# Patient Record
Sex: MALE | Race: WHITE | NOT HISPANIC OR LATINO | Employment: OTHER | URBAN - METROPOLITAN AREA
[De-identification: names, ages, dates, MRNs, and addresses within clinical notes are randomized per-mention and may not be internally consistent; named-entity substitution may affect disease eponyms.]

---

## 2017-02-14 ENCOUNTER — OFFICE VISIT - HEALTHEAST (OUTPATIENT)
Dept: FAMILY MEDICINE | Facility: CLINIC | Age: 52
End: 2017-02-14

## 2017-02-14 DIAGNOSIS — R79.89 ABNORMAL LFTS: ICD-10-CM

## 2017-02-14 DIAGNOSIS — E78.5 HYPERLIPIDEMIA, UNSPECIFIED HYPERLIPIDEMIA TYPE: ICD-10-CM

## 2017-02-14 DIAGNOSIS — I10 ESSENTIAL HYPERTENSION WITH GOAL BLOOD PRESSURE LESS THAN 130/80: ICD-10-CM

## 2017-02-14 DIAGNOSIS — E11.9 TYPE 2 DIABETES MELLITUS (H): ICD-10-CM

## 2017-02-14 DIAGNOSIS — E66.9 OBESITY, UNSPECIFIED: ICD-10-CM

## 2017-02-14 LAB
CHOLEST SERPL-MCNC: 143 MG/DL
FASTING STATUS PATIENT QL REPORTED: NORMAL
HBA1C MFR BLD: 7.4 % (ref 3.5–6)
HDLC SERPL-MCNC: 44 MG/DL
LDLC SERPL CALC-MCNC: 70 MG/DL
TRIGL SERPL-MCNC: 145 MG/DL

## 2017-04-19 ENCOUNTER — COMMUNICATION - HEALTHEAST (OUTPATIENT)
Dept: FAMILY MEDICINE | Facility: CLINIC | Age: 52
End: 2017-04-19

## 2017-04-19 DIAGNOSIS — I10 HTN (HYPERTENSION): ICD-10-CM

## 2017-05-05 ENCOUNTER — COMMUNICATION - HEALTHEAST (OUTPATIENT)
Dept: FAMILY MEDICINE | Facility: CLINIC | Age: 52
End: 2017-05-05

## 2017-05-12 ENCOUNTER — COMMUNICATION - HEALTHEAST (OUTPATIENT)
Dept: FAMILY MEDICINE | Facility: CLINIC | Age: 52
End: 2017-05-12

## 2017-05-12 DIAGNOSIS — E11.9 TYPE 2 DIABETES MELLITUS WITHOUT COMPLICATION (H): ICD-10-CM

## 2017-06-20 ENCOUNTER — AMBULATORY - HEALTHEAST (OUTPATIENT)
Dept: FAMILY MEDICINE | Facility: CLINIC | Age: 52
End: 2017-06-20

## 2017-06-20 ENCOUNTER — OFFICE VISIT - HEALTHEAST (OUTPATIENT)
Dept: FAMILY MEDICINE | Facility: CLINIC | Age: 52
End: 2017-06-20

## 2017-06-20 DIAGNOSIS — E78.5 HYPERLIPIDEMIA, UNSPECIFIED HYPERLIPIDEMIA TYPE: ICD-10-CM

## 2017-06-20 DIAGNOSIS — K21.9 GASTROESOPHAGEAL REFLUX DISEASE WITHOUT ESOPHAGITIS: ICD-10-CM

## 2017-06-20 DIAGNOSIS — E11.9 TYPE 2 DIABETES MELLITUS (H): ICD-10-CM

## 2017-06-20 DIAGNOSIS — M54.50 CHRONIC BILATERAL LOW BACK PAIN WITHOUT SCIATICA: ICD-10-CM

## 2017-06-20 DIAGNOSIS — M77.11 LATERAL EPICONDYLITIS OF RIGHT ELBOW: ICD-10-CM

## 2017-06-20 DIAGNOSIS — I10 ESSENTIAL HYPERTENSION WITH GOAL BLOOD PRESSURE LESS THAN 130/80: ICD-10-CM

## 2017-06-20 DIAGNOSIS — G89.4 CHRONIC PAIN SYNDROME: ICD-10-CM

## 2017-06-20 DIAGNOSIS — G89.29 CHRONIC BILATERAL LOW BACK PAIN WITHOUT SCIATICA: ICD-10-CM

## 2017-06-20 DIAGNOSIS — R79.89 ABNORMAL LFTS: ICD-10-CM

## 2017-06-20 DIAGNOSIS — E66.09 NON MORBID OBESITY DUE TO EXCESS CALORIES: ICD-10-CM

## 2017-06-20 DIAGNOSIS — Z00.00 ROUTINE GENERAL MEDICAL EXAMINATION AT A HEALTH CARE FACILITY: ICD-10-CM

## 2017-06-20 LAB — HBA1C MFR BLD: 7.5 % (ref 3.5–6)

## 2017-06-20 ASSESSMENT — MIFFLIN-ST. JEOR: SCORE: 1863.19

## 2017-07-18 ENCOUNTER — COMMUNICATION - HEALTHEAST (OUTPATIENT)
Dept: FAMILY MEDICINE | Facility: CLINIC | Age: 52
End: 2017-07-18

## 2017-07-18 DIAGNOSIS — I10 HTN (HYPERTENSION): ICD-10-CM

## 2017-07-27 ENCOUNTER — RECORDS - HEALTHEAST (OUTPATIENT)
Dept: ADMINISTRATIVE | Facility: OTHER | Age: 52
End: 2017-07-27

## 2017-08-11 ENCOUNTER — COMMUNICATION - HEALTHEAST (OUTPATIENT)
Dept: FAMILY MEDICINE | Facility: CLINIC | Age: 52
End: 2017-08-11

## 2017-08-11 DIAGNOSIS — E11.9 TYPE 2 DIABETES MELLITUS WITHOUT COMPLICATION (H): ICD-10-CM

## 2017-08-16 ENCOUNTER — COMMUNICATION - HEALTHEAST (OUTPATIENT)
Dept: FAMILY MEDICINE | Facility: CLINIC | Age: 52
End: 2017-08-16

## 2017-08-16 DIAGNOSIS — E78.5 HYPERLIPIDEMIA: ICD-10-CM

## 2017-09-02 ENCOUNTER — COMMUNICATION - HEALTHEAST (OUTPATIENT)
Dept: FAMILY MEDICINE | Facility: CLINIC | Age: 52
End: 2017-09-02

## 2017-09-02 DIAGNOSIS — E11.9 TYPE 2 DIABETES MELLITUS WITHOUT COMPLICATION (H): ICD-10-CM

## 2017-11-21 ENCOUNTER — OFFICE VISIT - HEALTHEAST (OUTPATIENT)
Dept: FAMILY MEDICINE | Facility: CLINIC | Age: 52
End: 2017-11-21

## 2017-11-21 DIAGNOSIS — M54.50 CHRONIC BILATERAL LOW BACK PAIN WITHOUT SCIATICA: ICD-10-CM

## 2017-11-21 DIAGNOSIS — E11.9 TYPE 2 DIABETES MELLITUS (H): ICD-10-CM

## 2017-11-21 DIAGNOSIS — G89.4 CHRONIC PAIN SYNDROME: ICD-10-CM

## 2017-11-21 DIAGNOSIS — Z23 NEED FOR VACCINATION: ICD-10-CM

## 2017-11-21 DIAGNOSIS — I10 ESSENTIAL HYPERTENSION WITH GOAL BLOOD PRESSURE LESS THAN 130/80: ICD-10-CM

## 2017-11-21 DIAGNOSIS — E66.09 NON MORBID OBESITY DUE TO EXCESS CALORIES: ICD-10-CM

## 2017-11-21 DIAGNOSIS — E78.5 HYPERLIPIDEMIA, UNSPECIFIED HYPERLIPIDEMIA TYPE: ICD-10-CM

## 2017-11-21 DIAGNOSIS — R79.89 ABNORMAL LFTS: ICD-10-CM

## 2017-11-21 DIAGNOSIS — G89.29 CHRONIC BILATERAL LOW BACK PAIN WITHOUT SCIATICA: ICD-10-CM

## 2017-11-21 LAB
CHOLEST SERPL-MCNC: 142 MG/DL
FASTING STATUS PATIENT QL REPORTED: YES
HBA1C MFR BLD: 7.2 % (ref 3.5–6)
HDLC SERPL-MCNC: 43 MG/DL
LDLC SERPL CALC-MCNC: 63 MG/DL
TRIGL SERPL-MCNC: 182 MG/DL

## 2017-12-27 ENCOUNTER — COMMUNICATION - HEALTHEAST (OUTPATIENT)
Dept: FAMILY MEDICINE | Facility: CLINIC | Age: 52
End: 2017-12-27

## 2017-12-27 DIAGNOSIS — E11.9 TYPE 2 DIABETES MELLITUS WITHOUT COMPLICATION (H): ICD-10-CM

## 2018-01-25 ENCOUNTER — COMMUNICATION - HEALTHEAST (OUTPATIENT)
Dept: SCHEDULING | Facility: CLINIC | Age: 53
End: 2018-01-25

## 2018-01-31 ENCOUNTER — COMMUNICATION - HEALTHEAST (OUTPATIENT)
Dept: SCHEDULING | Facility: CLINIC | Age: 53
End: 2018-01-31

## 2018-01-31 ENCOUNTER — OFFICE VISIT - HEALTHEAST (OUTPATIENT)
Dept: FAMILY MEDICINE | Facility: CLINIC | Age: 53
End: 2018-01-31
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
## 2018-01-31 DIAGNOSIS — R51.9 PERSISTENT HEADACHES: ICD-10-CM

## 2018-01-31 DIAGNOSIS — E11.9 TYPE 2 DIABETES MELLITUS (H): ICD-10-CM

## 2018-01-31 DIAGNOSIS — J32.9 SINUSITIS: ICD-10-CM

## 2018-01-31 LAB
C REACTIVE PROTEIN LHE: 0.1 MG/DL (ref 0–0.8)
ERYTHROCYTE [SEDIMENTATION RATE] IN BLOOD BY WESTERGREN METHOD: 5 MM/HR (ref 0–15)
HBA1C MFR BLD: 7.5 % (ref 3.5–6)

## 2018-01-31 ASSESSMENT — MIFFLIN-ST. JEOR: SCORE: 1893.12

## 2018-02-01 ENCOUNTER — HOSPITAL ENCOUNTER (OUTPATIENT)
Dept: CT IMAGING | Facility: CLINIC | Age: 53
Discharge: HOME | End: 2018-02-01
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
## 2018-02-01 DIAGNOSIS — R51.9 PERSISTENT HEADACHES: ICD-10-CM

## 2018-03-20 ENCOUNTER — OFFICE VISIT - HEALTHEAST (OUTPATIENT)
Dept: FAMILY MEDICINE | Facility: CLINIC | Age: 53
End: 2018-03-20

## 2018-03-20 DIAGNOSIS — I10 ESSENTIAL HYPERTENSION WITH GOAL BLOOD PRESSURE LESS THAN 130/80: ICD-10-CM

## 2018-03-20 DIAGNOSIS — E66.09 NON MORBID OBESITY DUE TO EXCESS CALORIES: ICD-10-CM

## 2018-03-20 DIAGNOSIS — E78.5 HYPERLIPIDEMIA, UNSPECIFIED HYPERLIPIDEMIA TYPE: ICD-10-CM

## 2018-03-20 DIAGNOSIS — G89.4 CHRONIC PAIN SYNDROME: ICD-10-CM

## 2018-03-20 DIAGNOSIS — E11.9 TYPE 2 DIABETES MELLITUS WITHOUT COMPLICATION, WITHOUT LONG-TERM CURRENT USE OF INSULIN (H): ICD-10-CM

## 2018-03-24 ENCOUNTER — COMMUNICATION - HEALTHEAST (OUTPATIENT)
Dept: FAMILY MEDICINE | Facility: CLINIC | Age: 53
End: 2018-03-24

## 2018-03-24 DIAGNOSIS — E11.9 TYPE 2 DIABETES MELLITUS WITHOUT COMPLICATION (H): ICD-10-CM

## 2018-05-02 ENCOUNTER — COMMUNICATION - HEALTHEAST (OUTPATIENT)
Dept: FAMILY MEDICINE | Facility: CLINIC | Age: 53
End: 2018-05-02

## 2018-05-02 DIAGNOSIS — E78.5 HYPERLIPIDEMIA: ICD-10-CM

## 2018-05-31 ENCOUNTER — COMMUNICATION - HEALTHEAST (OUTPATIENT)
Dept: FAMILY MEDICINE | Facility: CLINIC | Age: 53
End: 2018-05-31

## 2018-06-05 ENCOUNTER — COMMUNICATION - HEALTHEAST (OUTPATIENT)
Dept: FAMILY MEDICINE | Facility: CLINIC | Age: 53
End: 2018-06-05

## 2018-06-05 DIAGNOSIS — E78.5 HYPERLIPIDEMIA: ICD-10-CM

## 2018-06-14 ENCOUNTER — COMMUNICATION - HEALTHEAST (OUTPATIENT)
Dept: FAMILY MEDICINE | Facility: CLINIC | Age: 53
End: 2018-06-14

## 2018-06-14 DIAGNOSIS — E11.9 TYPE 2 DIABETES MELLITUS WITHOUT COMPLICATION (H): ICD-10-CM

## 2018-07-30 ENCOUNTER — RECORDS - HEALTHEAST (OUTPATIENT)
Dept: ADMINISTRATIVE | Facility: OTHER | Age: 53
End: 2018-07-30

## 2018-08-21 ENCOUNTER — OFFICE VISIT - HEALTHEAST (OUTPATIENT)
Dept: FAMILY MEDICINE | Facility: CLINIC | Age: 53
End: 2018-08-21

## 2018-08-21 ENCOUNTER — AMBULATORY - HEALTHEAST (OUTPATIENT)
Dept: FAMILY MEDICINE | Facility: CLINIC | Age: 53
End: 2018-08-21

## 2018-08-21 DIAGNOSIS — R79.89 ABNORMAL LFTS: ICD-10-CM

## 2018-08-21 DIAGNOSIS — E11.9 DIABETES MELLITUS, TYPE 2 (H): ICD-10-CM

## 2018-08-21 DIAGNOSIS — E66.09 CLASS 1 OBESITY DUE TO EXCESS CALORIES WITH SERIOUS COMORBIDITY AND BODY MASS INDEX (BMI) OF 31.0 TO 31.9 IN ADULT: ICD-10-CM

## 2018-08-21 DIAGNOSIS — E66.811 CLASS 1 OBESITY DUE TO EXCESS CALORIES WITH SERIOUS COMORBIDITY AND BODY MASS INDEX (BMI) OF 31.0 TO 31.9 IN ADULT: ICD-10-CM

## 2018-08-21 DIAGNOSIS — E78.5 HYPERLIPIDEMIA, UNSPECIFIED HYPERLIPIDEMIA TYPE: ICD-10-CM

## 2018-08-21 DIAGNOSIS — D12.6 TUBULAR ADENOMA OF COLON: ICD-10-CM

## 2018-08-21 DIAGNOSIS — K21.9 GASTROESOPHAGEAL REFLUX DISEASE WITHOUT ESOPHAGITIS: ICD-10-CM

## 2018-08-21 DIAGNOSIS — Z00.00 ROUTINE GENERAL MEDICAL EXAMINATION AT A HEALTH CARE FACILITY: ICD-10-CM

## 2018-08-21 DIAGNOSIS — I10 ESSENTIAL HYPERTENSION: ICD-10-CM

## 2018-08-21 DIAGNOSIS — R06.83 SNORING: ICD-10-CM

## 2018-08-21 DIAGNOSIS — G89.4 CHRONIC PAIN SYNDROME: ICD-10-CM

## 2018-08-21 LAB
ALBUMIN SERPL-MCNC: 4.1 G/DL (ref 3.5–5)
ALP SERPL-CCNC: 95 U/L (ref 45–120)
ALT SERPL W P-5'-P-CCNC: 51 U/L (ref 0–45)
ANION GAP SERPL CALCULATED.3IONS-SCNC: 12 MMOL/L (ref 5–18)
AST SERPL W P-5'-P-CCNC: 34 U/L (ref 0–40)
BILIRUB SERPL-MCNC: 1.1 MG/DL (ref 0–1)
BUN SERPL-MCNC: 12 MG/DL (ref 8–22)
CALCIUM SERPL-MCNC: 10.2 MG/DL (ref 8.5–10.5)
CHLORIDE BLD-SCNC: 103 MMOL/L (ref 98–107)
CHOLEST SERPL-MCNC: 138 MG/DL
CO2 SERPL-SCNC: 24 MMOL/L (ref 22–31)
CREAT SERPL-MCNC: 0.97 MG/DL (ref 0.7–1.3)
CREAT UR-MCNC: 171.1 MG/DL
FASTING STATUS PATIENT QL REPORTED: YES
GFR SERPL CREATININE-BSD FRML MDRD: >60 ML/MIN/1.73M2
GLUCOSE BLD-MCNC: 164 MG/DL (ref 70–125)
HBA1C MFR BLD: 8.7 % (ref 3.5–6)
HDLC SERPL-MCNC: 45 MG/DL
LDLC SERPL CALC-MCNC: 61 MG/DL
MICROALBUMIN UR-MCNC: 0.97 MG/DL (ref 0–1.99)
MICROALBUMIN/CREAT UR: 5.7 MG/G
POTASSIUM BLD-SCNC: 4.3 MMOL/L (ref 3.5–5)
PROT SERPL-MCNC: 7.1 G/DL (ref 6–8)
SODIUM SERPL-SCNC: 139 MMOL/L (ref 136–145)
TRIGL SERPL-MCNC: 158 MG/DL

## 2018-08-21 ASSESSMENT — MIFFLIN-ST. JEOR: SCORE: 1867.72

## 2018-09-25 ENCOUNTER — COMMUNICATION - HEALTHEAST (OUTPATIENT)
Dept: FAMILY MEDICINE | Facility: CLINIC | Age: 53
End: 2018-09-25

## 2018-09-25 DIAGNOSIS — I10 HTN (HYPERTENSION): ICD-10-CM

## 2018-10-15 ENCOUNTER — OFFICE VISIT - HEALTHEAST (OUTPATIENT)
Dept: SLEEP MEDICINE | Facility: CLINIC | Age: 53
End: 2018-10-15

## 2018-10-15 DIAGNOSIS — R06.83 SNORING: ICD-10-CM

## 2018-10-15 DIAGNOSIS — Z91.89 AT RISK FOR SLEEP APNEA: ICD-10-CM

## 2018-10-15 ASSESSMENT — MIFFLIN-ST. JEOR: SCORE: 1874.98

## 2018-10-21 ENCOUNTER — COMMUNICATION - HEALTHEAST (OUTPATIENT)
Dept: FAMILY MEDICINE | Facility: CLINIC | Age: 53
End: 2018-10-21

## 2018-10-21 DIAGNOSIS — E78.5 HYPERLIPIDEMIA: ICD-10-CM

## 2018-11-24 ENCOUNTER — COMMUNICATION - HEALTHEAST (OUTPATIENT)
Dept: FAMILY MEDICINE | Facility: CLINIC | Age: 53
End: 2018-11-24

## 2018-11-24 DIAGNOSIS — E11.9 DIABETES MELLITUS, TYPE 2 (H): ICD-10-CM

## 2018-12-14 ENCOUNTER — OFFICE VISIT - HEALTHEAST (OUTPATIENT)
Dept: FAMILY MEDICINE | Facility: CLINIC | Age: 53
End: 2018-12-14

## 2018-12-14 DIAGNOSIS — E11.9 DIABETES MELLITUS, TYPE 2 (H): ICD-10-CM

## 2018-12-14 DIAGNOSIS — I10 ESSENTIAL HYPERTENSION: ICD-10-CM

## 2018-12-14 DIAGNOSIS — Z23 NEED FOR VACCINATION: ICD-10-CM

## 2018-12-14 DIAGNOSIS — R06.83 SNORING: ICD-10-CM

## 2018-12-14 DIAGNOSIS — M54.50 CHRONIC BILATERAL LOW BACK PAIN WITHOUT SCIATICA: ICD-10-CM

## 2018-12-14 DIAGNOSIS — E78.5 HYPERLIPIDEMIA, UNSPECIFIED HYPERLIPIDEMIA TYPE: ICD-10-CM

## 2018-12-14 DIAGNOSIS — G89.29 CHRONIC BILATERAL LOW BACK PAIN WITHOUT SCIATICA: ICD-10-CM

## 2018-12-14 DIAGNOSIS — D12.6 TUBULAR ADENOMA OF COLON: ICD-10-CM

## 2018-12-14 LAB
ALBUMIN SERPL-MCNC: 4 G/DL (ref 3.5–5)
ALP SERPL-CCNC: 91 U/L (ref 45–120)
ALT SERPL W P-5'-P-CCNC: 50 U/L (ref 0–45)
ANION GAP SERPL CALCULATED.3IONS-SCNC: 8 MMOL/L (ref 5–18)
AST SERPL W P-5'-P-CCNC: 33 U/L (ref 0–40)
BILIRUB SERPL-MCNC: 0.6 MG/DL (ref 0–1)
BUN SERPL-MCNC: 11 MG/DL (ref 8–22)
CALCIUM SERPL-MCNC: 9.5 MG/DL (ref 8.5–10.5)
CHLORIDE BLD-SCNC: 103 MMOL/L (ref 98–107)
CO2 SERPL-SCNC: 29 MMOL/L (ref 22–31)
CREAT SERPL-MCNC: 0.92 MG/DL (ref 0.7–1.3)
GFR SERPL CREATININE-BSD FRML MDRD: >60 ML/MIN/1.73M2
GLUCOSE BLD-MCNC: 146 MG/DL (ref 70–125)
HBA1C MFR BLD: 6.9 % (ref 3.5–6)
POTASSIUM BLD-SCNC: 5.1 MMOL/L (ref 3.5–5)
PROT SERPL-MCNC: 7.2 G/DL (ref 6–8)
SODIUM SERPL-SCNC: 140 MMOL/L (ref 136–145)

## 2019-01-10 ENCOUNTER — COMMUNICATION - HEALTHEAST (OUTPATIENT)
Dept: FAMILY MEDICINE | Facility: CLINIC | Age: 54
End: 2019-01-10

## 2019-01-10 DIAGNOSIS — I10 HTN (HYPERTENSION): ICD-10-CM

## 2019-01-25 ENCOUNTER — COMMUNICATION - HEALTHEAST (OUTPATIENT)
Dept: FAMILY MEDICINE | Facility: CLINIC | Age: 54
End: 2019-01-25

## 2019-01-25 DIAGNOSIS — I10 HTN (HYPERTENSION): ICD-10-CM

## 2019-04-23 ENCOUNTER — OFFICE VISIT - HEALTHEAST (OUTPATIENT)
Dept: FAMILY MEDICINE | Facility: CLINIC | Age: 54
End: 2019-04-23

## 2019-04-23 DIAGNOSIS — M77.11 RIGHT LATERAL EPICONDYLITIS: ICD-10-CM

## 2019-04-23 DIAGNOSIS — E11.9 DIABETES MELLITUS, TYPE 2 (H): ICD-10-CM

## 2019-04-23 DIAGNOSIS — D12.6 TUBULAR ADENOMA OF COLON: ICD-10-CM

## 2019-04-23 DIAGNOSIS — R05.9 COUGH: ICD-10-CM

## 2019-04-23 DIAGNOSIS — I10 ESSENTIAL HYPERTENSION: ICD-10-CM

## 2019-04-23 DIAGNOSIS — E78.5 HYPERLIPIDEMIA, UNSPECIFIED HYPERLIPIDEMIA TYPE: ICD-10-CM

## 2019-04-23 DIAGNOSIS — R06.02 SOB (SHORTNESS OF BREATH): ICD-10-CM

## 2019-04-23 LAB
ALBUMIN SERPL-MCNC: 4.1 G/DL (ref 3.5–5)
ALP SERPL-CCNC: 93 U/L (ref 45–120)
ALT SERPL W P-5'-P-CCNC: 48 U/L (ref 0–45)
ANION GAP SERPL CALCULATED.3IONS-SCNC: 10 MMOL/L (ref 5–18)
AST SERPL W P-5'-P-CCNC: 36 U/L (ref 0–40)
ATRIAL RATE - MUSE: 80 BPM
BILIRUB SERPL-MCNC: 0.9 MG/DL (ref 0–1)
BUN SERPL-MCNC: 17 MG/DL (ref 8–22)
CALCIUM SERPL-MCNC: 9.7 MG/DL (ref 8.5–10.5)
CHLORIDE BLD-SCNC: 102 MMOL/L (ref 98–107)
CHOLEST SERPL-MCNC: 164 MG/DL
CO2 SERPL-SCNC: 26 MMOL/L (ref 22–31)
CREAT SERPL-MCNC: 0.94 MG/DL (ref 0.7–1.3)
DIASTOLIC BLOOD PRESSURE - MUSE: NORMAL MMHG
FASTING STATUS PATIENT QL REPORTED: YES
GFR SERPL CREATININE-BSD FRML MDRD: >60 ML/MIN/1.73M2
GLUCOSE BLD-MCNC: 132 MG/DL (ref 70–125)
HBA1C MFR BLD: 7.2 % (ref 3.5–6)
HDLC SERPL-MCNC: 52 MG/DL
INTERPRETATION ECG - MUSE: NORMAL
LDLC SERPL CALC-MCNC: 81 MG/DL
P AXIS - MUSE: 24 DEGREES
POTASSIUM BLD-SCNC: 4.5 MMOL/L (ref 3.5–5)
PR INTERVAL - MUSE: 176 MS
PROT SERPL-MCNC: 7.1 G/DL (ref 6–8)
QRS DURATION - MUSE: 92 MS
QT - MUSE: 366 MS
QTC - MUSE: 422 MS
R AXIS - MUSE: -2 DEGREES
SODIUM SERPL-SCNC: 138 MMOL/L (ref 136–145)
SYSTOLIC BLOOD PRESSURE - MUSE: NORMAL MMHG
T AXIS - MUSE: 25 DEGREES
TRIGL SERPL-MCNC: 154 MG/DL
VENTRICULAR RATE- MUSE: 80 BPM

## 2019-04-27 ENCOUNTER — COMMUNICATION - HEALTHEAST (OUTPATIENT)
Dept: FAMILY MEDICINE | Facility: CLINIC | Age: 54
End: 2019-04-27

## 2019-04-27 DIAGNOSIS — E78.5 HYPERLIPIDEMIA: ICD-10-CM

## 2019-07-14 ENCOUNTER — COMMUNICATION - HEALTHEAST (OUTPATIENT)
Dept: FAMILY MEDICINE | Facility: CLINIC | Age: 54
End: 2019-07-14

## 2019-07-14 DIAGNOSIS — I10 HTN (HYPERTENSION): ICD-10-CM

## 2019-07-16 ENCOUNTER — COMMUNICATION - HEALTHEAST (OUTPATIENT)
Dept: FAMILY MEDICINE | Facility: CLINIC | Age: 54
End: 2019-07-16

## 2019-07-16 DIAGNOSIS — L30.9 DERMATITIS: ICD-10-CM

## 2019-07-24 ENCOUNTER — COMMUNICATION - HEALTHEAST (OUTPATIENT)
Dept: FAMILY MEDICINE | Facility: CLINIC | Age: 54
End: 2019-07-24

## 2019-07-24 DIAGNOSIS — E11.9 DIABETES MELLITUS, TYPE 2 (H): ICD-10-CM

## 2019-08-02 ENCOUNTER — RECORDS - HEALTHEAST (OUTPATIENT)
Dept: ADMINISTRATIVE | Facility: OTHER | Age: 54
End: 2019-08-02

## 2019-08-05 ENCOUNTER — RECORDS - HEALTHEAST (OUTPATIENT)
Dept: ADMINISTRATIVE | Facility: OTHER | Age: 54
End: 2019-08-05

## 2019-08-13 ENCOUNTER — COMMUNICATION - HEALTHEAST (OUTPATIENT)
Dept: SCHEDULING | Facility: CLINIC | Age: 54
End: 2019-08-13

## 2019-08-13 ENCOUNTER — RECORDS - HEALTHEAST (OUTPATIENT)
Dept: HEALTH INFORMATION MANAGEMENT | Facility: CLINIC | Age: 54
End: 2019-08-13

## 2019-08-14 ENCOUNTER — RECORDS - HEALTHEAST (OUTPATIENT)
Dept: ADMINISTRATIVE | Facility: OTHER | Age: 54
End: 2019-08-14

## 2019-08-27 ENCOUNTER — OFFICE VISIT - HEALTHEAST (OUTPATIENT)
Dept: FAMILY MEDICINE | Facility: CLINIC | Age: 54
End: 2019-08-27

## 2019-08-27 ENCOUNTER — AMBULATORY - HEALTHEAST (OUTPATIENT)
Dept: FAMILY MEDICINE | Facility: CLINIC | Age: 54
End: 2019-08-27

## 2019-08-27 DIAGNOSIS — R06.83 SNORING: ICD-10-CM

## 2019-08-27 DIAGNOSIS — E11.9 TYPE 2 DIABETES MELLITUS WITHOUT COMPLICATION, WITHOUT LONG-TERM CURRENT USE OF INSULIN (H): ICD-10-CM

## 2019-08-27 DIAGNOSIS — K21.9 GASTROESOPHAGEAL REFLUX DISEASE WITHOUT ESOPHAGITIS: ICD-10-CM

## 2019-08-27 DIAGNOSIS — E66.09 CLASS 1 OBESITY DUE TO EXCESS CALORIES WITH SERIOUS COMORBIDITY AND BODY MASS INDEX (BMI) OF 31.0 TO 31.9 IN ADULT: ICD-10-CM

## 2019-08-27 DIAGNOSIS — M25.562 ACUTE PAIN OF LEFT KNEE: ICD-10-CM

## 2019-08-27 DIAGNOSIS — E78.5 HYPERLIPIDEMIA, UNSPECIFIED HYPERLIPIDEMIA TYPE: ICD-10-CM

## 2019-08-27 DIAGNOSIS — R79.89 ABNORMAL LFTS: ICD-10-CM

## 2019-08-27 DIAGNOSIS — I10 ESSENTIAL HYPERTENSION: ICD-10-CM

## 2019-08-27 DIAGNOSIS — E66.811 CLASS 1 OBESITY DUE TO EXCESS CALORIES WITH SERIOUS COMORBIDITY AND BODY MASS INDEX (BMI) OF 31.0 TO 31.9 IN ADULT: ICD-10-CM

## 2019-08-27 DIAGNOSIS — Z00.00 ROUTINE GENERAL MEDICAL EXAMINATION AT A HEALTH CARE FACILITY: ICD-10-CM

## 2019-08-27 DIAGNOSIS — D12.6 TUBULAR ADENOMA OF COLON: ICD-10-CM

## 2019-08-27 LAB
ALBUMIN SERPL-MCNC: 4.1 G/DL (ref 3.5–5)
ALP SERPL-CCNC: 87 U/L (ref 45–120)
ALT SERPL W P-5'-P-CCNC: 89 U/L (ref 0–45)
ANION GAP SERPL CALCULATED.3IONS-SCNC: 9 MMOL/L (ref 5–18)
AST SERPL W P-5'-P-CCNC: 72 U/L (ref 0–40)
BILIRUB SERPL-MCNC: 0.6 MG/DL (ref 0–1)
BUN SERPL-MCNC: 13 MG/DL (ref 8–22)
CALCIUM SERPL-MCNC: 10.2 MG/DL (ref 8.5–10.5)
CHLORIDE BLD-SCNC: 101 MMOL/L (ref 98–107)
CHOLEST SERPL-MCNC: 152 MG/DL
CO2 SERPL-SCNC: 28 MMOL/L (ref 22–31)
CREAT SERPL-MCNC: 1.05 MG/DL (ref 0.7–1.3)
CREAT UR-MCNC: 132 MG/DL
FASTING STATUS PATIENT QL REPORTED: YES
GFR SERPL CREATININE-BSD FRML MDRD: >60 ML/MIN/1.73M2
GLUCOSE BLD-MCNC: 140 MG/DL (ref 70–125)
HBA1C MFR BLD: 7.3 % (ref 3.5–6)
HDLC SERPL-MCNC: 47 MG/DL
LDLC SERPL CALC-MCNC: 78 MG/DL
MICROALBUMIN UR-MCNC: 1.01 MG/DL (ref 0–1.99)
MICROALBUMIN/CREAT UR: 7.7 MG/G
POTASSIUM BLD-SCNC: 4.5 MMOL/L (ref 3.5–5)
PROT SERPL-MCNC: 7.2 G/DL (ref 6–8)
SODIUM SERPL-SCNC: 138 MMOL/L (ref 136–145)
TRIGL SERPL-MCNC: 135 MG/DL

## 2019-08-27 ASSESSMENT — MIFFLIN-ST. JEOR: SCORE: 1885.87

## 2019-10-18 ENCOUNTER — COMMUNICATION - HEALTHEAST (OUTPATIENT)
Dept: FAMILY MEDICINE | Facility: CLINIC | Age: 54
End: 2019-10-18

## 2019-10-18 DIAGNOSIS — E11.9 DIABETES MELLITUS, TYPE 2 (H): ICD-10-CM

## 2019-12-13 ENCOUNTER — RECORDS - HEALTHEAST (OUTPATIENT)
Dept: ADMINISTRATIVE | Facility: OTHER | Age: 54
End: 2019-12-13

## 2019-12-31 ENCOUNTER — RECORDS - HEALTHEAST (OUTPATIENT)
Dept: ADMINISTRATIVE | Facility: OTHER | Age: 54
End: 2019-12-31

## 2019-12-31 ENCOUNTER — OFFICE VISIT - HEALTHEAST (OUTPATIENT)
Dept: FAMILY MEDICINE | Facility: CLINIC | Age: 54
End: 2019-12-31

## 2019-12-31 DIAGNOSIS — Z11.4 ENCOUNTER FOR SCREENING FOR HIV: ICD-10-CM

## 2019-12-31 DIAGNOSIS — I10 ESSENTIAL HYPERTENSION: ICD-10-CM

## 2019-12-31 DIAGNOSIS — E11.9 TYPE 2 DIABETES MELLITUS WITHOUT COMPLICATION, WITHOUT LONG-TERM CURRENT USE OF INSULIN (H): ICD-10-CM

## 2019-12-31 DIAGNOSIS — E66.811 CLASS 1 OBESITY DUE TO EXCESS CALORIES WITH SERIOUS COMORBIDITY AND BODY MASS INDEX (BMI) OF 31.0 TO 31.9 IN ADULT: ICD-10-CM

## 2019-12-31 DIAGNOSIS — Z23 NEED FOR VACCINATION: ICD-10-CM

## 2019-12-31 DIAGNOSIS — E66.09 CLASS 1 OBESITY DUE TO EXCESS CALORIES WITH SERIOUS COMORBIDITY AND BODY MASS INDEX (BMI) OF 31.0 TO 31.9 IN ADULT: ICD-10-CM

## 2019-12-31 DIAGNOSIS — R79.89 ABNORMAL LFTS: ICD-10-CM

## 2019-12-31 DIAGNOSIS — E78.5 HYPERLIPIDEMIA, UNSPECIFIED HYPERLIPIDEMIA TYPE: ICD-10-CM

## 2019-12-31 LAB
ALBUMIN SERPL-MCNC: 4.2 G/DL (ref 3.5–5)
ALP SERPL-CCNC: 96 U/L (ref 45–120)
ALT SERPL W P-5'-P-CCNC: 55 U/L (ref 0–45)
ANION GAP SERPL CALCULATED.3IONS-SCNC: 13 MMOL/L (ref 5–18)
AST SERPL W P-5'-P-CCNC: 36 U/L (ref 0–40)
BILIRUB SERPL-MCNC: 1.3 MG/DL (ref 0–1)
BUN SERPL-MCNC: 16 MG/DL (ref 8–22)
CALCIUM SERPL-MCNC: 10 MG/DL (ref 8.5–10.5)
CHLORIDE BLD-SCNC: 103 MMOL/L (ref 98–107)
CO2 SERPL-SCNC: 24 MMOL/L (ref 22–31)
CREAT SERPL-MCNC: 0.9 MG/DL (ref 0.7–1.3)
GFR SERPL CREATININE-BSD FRML MDRD: >60 ML/MIN/1.73M2
GLUCOSE BLD-MCNC: 128 MG/DL (ref 70–125)
HBA1C MFR BLD: 7.4 % (ref 3.5–6)
HIV 1+2 AB+HIV1 P24 AG SERPL QL IA: NEGATIVE
POTASSIUM BLD-SCNC: 4.5 MMOL/L (ref 3.5–5)
PROT SERPL-MCNC: 7.4 G/DL (ref 6–8)
SODIUM SERPL-SCNC: 140 MMOL/L (ref 136–145)

## 2019-12-31 ASSESSMENT — MIFFLIN-ST. JEOR: SCORE: 1863.64

## 2020-01-15 ENCOUNTER — RECORDS - HEALTHEAST (OUTPATIENT)
Dept: ADMINISTRATIVE | Facility: OTHER | Age: 55
End: 2020-01-15

## 2020-01-16 ENCOUNTER — COMMUNICATION - HEALTHEAST (OUTPATIENT)
Dept: FAMILY MEDICINE | Facility: CLINIC | Age: 55
End: 2020-01-16

## 2020-01-24 ENCOUNTER — OFFICE VISIT - HEALTHEAST (OUTPATIENT)
Dept: FAMILY MEDICINE | Facility: CLINIC | Age: 55
End: 2020-01-24

## 2020-01-24 DIAGNOSIS — I10 ESSENTIAL HYPERTENSION: ICD-10-CM

## 2020-01-24 DIAGNOSIS — M54.50 CHRONIC BILATERAL LOW BACK PAIN WITHOUT SCIATICA: ICD-10-CM

## 2020-01-24 DIAGNOSIS — G89.29 CHRONIC BILATERAL LOW BACK PAIN WITHOUT SCIATICA: ICD-10-CM

## 2020-01-24 DIAGNOSIS — E11.9 TYPE 2 DIABETES MELLITUS WITHOUT COMPLICATION, WITHOUT LONG-TERM CURRENT USE OF INSULIN (H): ICD-10-CM

## 2020-01-24 DIAGNOSIS — Z01.818 PREOP GENERAL PHYSICAL EXAM: ICD-10-CM

## 2020-01-24 DIAGNOSIS — E78.5 HYPERLIPIDEMIA, UNSPECIFIED HYPERLIPIDEMIA TYPE: ICD-10-CM

## 2020-01-24 DIAGNOSIS — E66.09 CLASS 1 OBESITY DUE TO EXCESS CALORIES WITH SERIOUS COMORBIDITY AND BODY MASS INDEX (BMI) OF 31.0 TO 31.9 IN ADULT: ICD-10-CM

## 2020-01-24 DIAGNOSIS — G89.4 CHRONIC PAIN SYNDROME: ICD-10-CM

## 2020-01-24 DIAGNOSIS — E66.811 CLASS 1 OBESITY DUE TO EXCESS CALORIES WITH SERIOUS COMORBIDITY AND BODY MASS INDEX (BMI) OF 31.0 TO 31.9 IN ADULT: ICD-10-CM

## 2020-01-24 DIAGNOSIS — K21.9 GASTROESOPHAGEAL REFLUX DISEASE WITHOUT ESOPHAGITIS: ICD-10-CM

## 2020-01-24 DIAGNOSIS — S83.8X2D ACUTE MEDIAL MENISCAL INJURY OF LEFT KNEE, SUBSEQUENT ENCOUNTER: ICD-10-CM

## 2020-01-24 DIAGNOSIS — R06.83 SNORING: ICD-10-CM

## 2020-01-24 LAB
ANION GAP SERPL CALCULATED.3IONS-SCNC: 14 MMOL/L (ref 5–18)
BUN SERPL-MCNC: 16 MG/DL (ref 8–22)
CALCIUM SERPL-MCNC: 9.8 MG/DL (ref 8.5–10.5)
CHLORIDE BLD-SCNC: 105 MMOL/L (ref 98–107)
CO2 SERPL-SCNC: 22 MMOL/L (ref 22–31)
CREAT SERPL-MCNC: 1.05 MG/DL (ref 0.7–1.3)
GFR SERPL CREATININE-BSD FRML MDRD: >60 ML/MIN/1.73M2
GLUCOSE BLD-MCNC: 188 MG/DL (ref 70–125)
POTASSIUM BLD-SCNC: 4 MMOL/L (ref 3.5–5)
SODIUM SERPL-SCNC: 141 MMOL/L (ref 136–145)

## 2020-01-24 ASSESSMENT — MIFFLIN-ST. JEOR: SCORE: 1864.32

## 2020-01-27 ENCOUNTER — RECORDS - HEALTHEAST (OUTPATIENT)
Dept: ADMINISTRATIVE | Facility: OTHER | Age: 55
End: 2020-01-27

## 2020-01-27 LAB
ATRIAL RATE - MUSE: 97 BPM
DIASTOLIC BLOOD PRESSURE - MUSE: NORMAL
INTERPRETATION ECG - MUSE: NORMAL
P AXIS - MUSE: 30 DEGREES
PR INTERVAL - MUSE: 158 MS
QRS DURATION - MUSE: 90 MS
QT - MUSE: 344 MS
QTC - MUSE: 436 MS
R AXIS - MUSE: -8 DEGREES
SYSTOLIC BLOOD PRESSURE - MUSE: NORMAL
T AXIS - MUSE: 21 DEGREES
VENTRICULAR RATE- MUSE: 97 BPM

## 2020-01-29 ENCOUNTER — RECORDS - HEALTHEAST (OUTPATIENT)
Dept: ADMINISTRATIVE | Facility: OTHER | Age: 55
End: 2020-01-29

## 2020-02-05 ENCOUNTER — OFFICE VISIT - HEALTHEAST (OUTPATIENT)
Dept: SLEEP MEDICINE | Facility: CLINIC | Age: 55
End: 2020-02-05

## 2020-02-05 DIAGNOSIS — I10 BENIGN ESSENTIAL HYPERTENSION: ICD-10-CM

## 2020-02-05 DIAGNOSIS — R06.83 SNORING: Primary | ICD-10-CM

## 2020-02-05 DIAGNOSIS — R06.83 SNORING: ICD-10-CM

## 2020-02-05 DIAGNOSIS — E11.9 TYPE 2 DIABETES MELLITUS WITHOUT COMPLICATION, WITHOUT LONG-TERM CURRENT USE OF INSULIN (H): ICD-10-CM

## 2020-02-05 ASSESSMENT — MIFFLIN-ST. JEOR: SCORE: 1873.39

## 2020-02-06 ENCOUNTER — COMMUNICATION - HEALTHEAST (OUTPATIENT)
Dept: SLEEP MEDICINE | Facility: CLINIC | Age: 55
End: 2020-02-06

## 2020-02-07 ENCOUNTER — RECORDS - HEALTHEAST (OUTPATIENT)
Dept: ADMINISTRATIVE | Facility: OTHER | Age: 55
End: 2020-02-07

## 2020-02-07 ENCOUNTER — COMMUNICATION - HEALTHEAST (OUTPATIENT)
Dept: SLEEP MEDICINE | Facility: CLINIC | Age: 55
End: 2020-02-07

## 2020-02-17 ENCOUNTER — RECORDS - HEALTHEAST (OUTPATIENT)
Dept: ADMINISTRATIVE | Facility: OTHER | Age: 55
End: 2020-02-17

## 2020-02-21 ENCOUNTER — COMMUNICATION - HEALTHEAST (OUTPATIENT)
Dept: FAMILY MEDICINE | Facility: CLINIC | Age: 55
End: 2020-02-21

## 2020-02-28 ENCOUNTER — RECORDS - HEALTHEAST (OUTPATIENT)
Dept: ADMINISTRATIVE | Facility: OTHER | Age: 55
End: 2020-02-28

## 2020-03-04 ENCOUNTER — COMMUNICATION - HEALTHEAST (OUTPATIENT)
Dept: SLEEP MEDICINE | Facility: CLINIC | Age: 55
End: 2020-03-04

## 2020-03-27 ENCOUNTER — RECORDS - HEALTHEAST (OUTPATIENT)
Dept: ADMINISTRATIVE | Facility: OTHER | Age: 55
End: 2020-03-27

## 2020-04-11 ENCOUNTER — COMMUNICATION - HEALTHEAST (OUTPATIENT)
Dept: FAMILY MEDICINE | Facility: CLINIC | Age: 55
End: 2020-04-11

## 2020-04-11 DIAGNOSIS — E78.5 HYPERLIPIDEMIA: ICD-10-CM

## 2020-05-13 ENCOUNTER — RECORDS - HEALTHEAST (OUTPATIENT)
Dept: ADMINISTRATIVE | Facility: OTHER | Age: 55
End: 2020-05-13

## 2020-05-20 ENCOUNTER — OFFICE VISIT - HEALTHEAST (OUTPATIENT)
Dept: FAMILY MEDICINE | Facility: CLINIC | Age: 55
End: 2020-05-20

## 2020-05-20 DIAGNOSIS — K21.9 GASTROESOPHAGEAL REFLUX DISEASE WITHOUT ESOPHAGITIS: ICD-10-CM

## 2020-05-20 DIAGNOSIS — G89.4 CHRONIC PAIN SYNDROME: ICD-10-CM

## 2020-05-20 DIAGNOSIS — E11.9 TYPE 2 DIABETES MELLITUS WITHOUT COMPLICATION, WITHOUT LONG-TERM CURRENT USE OF INSULIN (H): ICD-10-CM

## 2020-05-20 DIAGNOSIS — I10 ESSENTIAL HYPERTENSION: ICD-10-CM

## 2020-05-20 DIAGNOSIS — G89.29 CHRONIC BILATERAL LOW BACK PAIN WITHOUT SCIATICA: ICD-10-CM

## 2020-05-20 DIAGNOSIS — E78.5 HYPERLIPIDEMIA, UNSPECIFIED HYPERLIPIDEMIA TYPE: ICD-10-CM

## 2020-05-20 DIAGNOSIS — M54.50 CHRONIC BILATERAL LOW BACK PAIN WITHOUT SCIATICA: ICD-10-CM

## 2020-06-23 ENCOUNTER — RECORDS - HEALTHEAST (OUTPATIENT)
Dept: ADMINISTRATIVE | Facility: OTHER | Age: 55
End: 2020-06-23

## 2020-07-06 ENCOUNTER — RECORDS - HEALTHEAST (OUTPATIENT)
Dept: ADMINISTRATIVE | Facility: OTHER | Age: 55
End: 2020-07-06

## 2020-07-07 ENCOUNTER — COMMUNICATION - HEALTHEAST (OUTPATIENT)
Dept: FAMILY MEDICINE | Facility: CLINIC | Age: 55
End: 2020-07-07

## 2020-07-07 DIAGNOSIS — I10 HTN (HYPERTENSION): ICD-10-CM

## 2020-07-22 ENCOUNTER — RECORDS - HEALTHEAST (OUTPATIENT)
Dept: ADMINISTRATIVE | Facility: OTHER | Age: 55
End: 2020-07-22

## 2020-07-23 ENCOUNTER — COMMUNICATION - HEALTHEAST (OUTPATIENT)
Dept: FAMILY MEDICINE | Facility: CLINIC | Age: 55
End: 2020-07-23

## 2020-07-23 DIAGNOSIS — I10 HTN (HYPERTENSION): ICD-10-CM

## 2020-08-10 ENCOUNTER — RECORDS - HEALTHEAST (OUTPATIENT)
Dept: ADMINISTRATIVE | Facility: OTHER | Age: 55
End: 2020-08-10

## 2020-08-10 LAB — RETINOPATHY: NEGATIVE

## 2020-08-14 ENCOUNTER — RECORDS - HEALTHEAST (OUTPATIENT)
Dept: HEALTH INFORMATION MANAGEMENT | Facility: CLINIC | Age: 55
End: 2020-08-14

## 2020-09-17 ENCOUNTER — AMBULATORY - HEALTHEAST (OUTPATIENT)
Dept: FAMILY MEDICINE | Facility: CLINIC | Age: 55
End: 2020-09-17

## 2020-09-17 ENCOUNTER — OFFICE VISIT - HEALTHEAST (OUTPATIENT)
Dept: FAMILY MEDICINE | Facility: CLINIC | Age: 55
End: 2020-09-17

## 2020-09-17 DIAGNOSIS — M25.561 CHRONIC PAIN OF BOTH KNEES: ICD-10-CM

## 2020-09-17 DIAGNOSIS — Z00.00 ROUTINE GENERAL MEDICAL EXAMINATION AT A HEALTH CARE FACILITY: ICD-10-CM

## 2020-09-17 DIAGNOSIS — G89.29 CHRONIC PAIN OF BOTH KNEES: ICD-10-CM

## 2020-09-17 DIAGNOSIS — K21.9 GASTROESOPHAGEAL REFLUX DISEASE WITHOUT ESOPHAGITIS: ICD-10-CM

## 2020-09-17 DIAGNOSIS — M54.50 CHRONIC BILATERAL LOW BACK PAIN WITHOUT SCIATICA: ICD-10-CM

## 2020-09-17 DIAGNOSIS — M25.562 CHRONIC PAIN OF BOTH KNEES: ICD-10-CM

## 2020-09-17 DIAGNOSIS — G89.4 CHRONIC PAIN SYNDROME: ICD-10-CM

## 2020-09-17 DIAGNOSIS — Z23 ENCOUNTER FOR IMMUNIZATION: ICD-10-CM

## 2020-09-17 DIAGNOSIS — I10 HTN (HYPERTENSION): ICD-10-CM

## 2020-09-17 DIAGNOSIS — E11.9 TYPE 2 DIABETES MELLITUS WITHOUT COMPLICATION, WITHOUT LONG-TERM CURRENT USE OF INSULIN (H): ICD-10-CM

## 2020-09-17 DIAGNOSIS — R79.89 ABNORMAL LFTS: ICD-10-CM

## 2020-09-17 DIAGNOSIS — E78.5 HYPERLIPIDEMIA, UNSPECIFIED HYPERLIPIDEMIA TYPE: ICD-10-CM

## 2020-09-17 DIAGNOSIS — E66.811 CLASS 1 OBESITY DUE TO EXCESS CALORIES WITH SERIOUS COMORBIDITY AND BODY MASS INDEX (BMI) OF 33.0 TO 33.9 IN ADULT: ICD-10-CM

## 2020-09-17 DIAGNOSIS — G89.29 CHRONIC BILATERAL LOW BACK PAIN WITHOUT SCIATICA: ICD-10-CM

## 2020-09-17 DIAGNOSIS — D12.6 TUBULAR ADENOMA OF COLON: ICD-10-CM

## 2020-09-17 DIAGNOSIS — E66.09 CLASS 1 OBESITY DUE TO EXCESS CALORIES WITH SERIOUS COMORBIDITY AND BODY MASS INDEX (BMI) OF 33.0 TO 33.9 IN ADULT: ICD-10-CM

## 2020-09-17 LAB
ALBUMIN SERPL-MCNC: 4 G/DL (ref 3.5–5)
ALP SERPL-CCNC: 90 U/L (ref 45–120)
ALT SERPL W P-5'-P-CCNC: 67 U/L (ref 0–45)
ANION GAP SERPL CALCULATED.3IONS-SCNC: 8 MMOL/L (ref 5–18)
AST SERPL W P-5'-P-CCNC: 47 U/L (ref 0–40)
BILIRUB SERPL-MCNC: 0.8 MG/DL (ref 0–1)
BUN SERPL-MCNC: 10 MG/DL (ref 8–22)
CALCIUM SERPL-MCNC: 9.2 MG/DL (ref 8.5–10.5)
CHLORIDE BLD-SCNC: 102 MMOL/L (ref 98–107)
CHOLEST SERPL-MCNC: 143 MG/DL
CO2 SERPL-SCNC: 27 MMOL/L (ref 22–31)
CREAT SERPL-MCNC: 0.91 MG/DL (ref 0.7–1.3)
CREAT UR-MCNC: 211.1 MG/DL
FASTING STATUS PATIENT QL REPORTED: YES
GFR SERPL CREATININE-BSD FRML MDRD: >60 ML/MIN/1.73M2
GLUCOSE BLD-MCNC: 120 MG/DL (ref 70–125)
HBA1C MFR BLD: 7.7 %
HDLC SERPL-MCNC: 38 MG/DL
LDLC SERPL CALC-MCNC: 59 MG/DL
MICROALBUMIN UR-MCNC: 1.52 MG/DL (ref 0–1.99)
MICROALBUMIN/CREAT UR: 7.2 MG/G
POTASSIUM BLD-SCNC: 4.2 MMOL/L (ref 3.5–5)
PROT SERPL-MCNC: 7 G/DL (ref 6–8)
PSA SERPL-MCNC: 1 NG/ML (ref 0–3.5)
SODIUM SERPL-SCNC: 137 MMOL/L (ref 136–145)
TRIGL SERPL-MCNC: 232 MG/DL

## 2020-09-17 RX ORDER — LOSARTAN POTASSIUM 100 MG/1
TABLET ORAL
Qty: 90 TABLET | Refills: 3 | Status: SHIPPED | OUTPATIENT
Start: 2020-09-17 | End: 2022-01-21

## 2020-10-03 ENCOUNTER — COMMUNICATION - HEALTHEAST (OUTPATIENT)
Dept: FAMILY MEDICINE | Facility: CLINIC | Age: 55
End: 2020-10-03

## 2020-10-03 DIAGNOSIS — E11.9 DIABETES MELLITUS, TYPE 2 (H): ICD-10-CM

## 2021-01-19 ENCOUNTER — OFFICE VISIT - HEALTHEAST (OUTPATIENT)
Dept: FAMILY MEDICINE | Facility: CLINIC | Age: 56
End: 2021-01-19

## 2021-01-19 DIAGNOSIS — I10 ESSENTIAL HYPERTENSION: ICD-10-CM

## 2021-01-19 DIAGNOSIS — E78.5 HYPERLIPIDEMIA, UNSPECIFIED HYPERLIPIDEMIA TYPE: ICD-10-CM

## 2021-01-19 DIAGNOSIS — R79.89 ABNORMAL LFTS: ICD-10-CM

## 2021-01-19 DIAGNOSIS — E11.9 TYPE 2 DIABETES MELLITUS WITHOUT COMPLICATION, WITHOUT LONG-TERM CURRENT USE OF INSULIN (H): ICD-10-CM

## 2021-01-19 LAB
ALBUMIN SERPL-MCNC: 4.3 G/DL (ref 3.5–5)
ALP SERPL-CCNC: 100 U/L (ref 45–120)
ALT SERPL W P-5'-P-CCNC: 95 U/L (ref 0–45)
ANION GAP SERPL CALCULATED.3IONS-SCNC: 12 MMOL/L (ref 5–18)
AST SERPL W P-5'-P-CCNC: 77 U/L (ref 0–40)
BILIRUB SERPL-MCNC: 0.7 MG/DL (ref 0–1)
BUN SERPL-MCNC: 15 MG/DL (ref 8–22)
CALCIUM SERPL-MCNC: 9.8 MG/DL (ref 8.5–10.5)
CHLORIDE BLD-SCNC: 101 MMOL/L (ref 98–107)
CO2 SERPL-SCNC: 25 MMOL/L (ref 22–31)
CREAT SERPL-MCNC: 1.02 MG/DL (ref 0.7–1.3)
GFR SERPL CREATININE-BSD FRML MDRD: >60 ML/MIN/1.73M2
GLUCOSE BLD-MCNC: 155 MG/DL (ref 70–125)
HBA1C MFR BLD: 8.2 %
POTASSIUM BLD-SCNC: 4.3 MMOL/L (ref 3.5–5)
PROT SERPL-MCNC: 7.5 G/DL (ref 6–8)
SODIUM SERPL-SCNC: 138 MMOL/L (ref 136–145)

## 2021-01-19 RX ORDER — GLIPIZIDE AND METFORMIN HCL 5; 500 MG/1; MG/1
2 TABLET, FILM COATED ORAL
Qty: 360 TABLET | Refills: 3 | Status: SHIPPED | OUTPATIENT
Start: 2021-01-19 | End: 2021-12-28

## 2021-03-22 ENCOUNTER — COMMUNICATION - HEALTHEAST (OUTPATIENT)
Dept: FAMILY MEDICINE | Facility: CLINIC | Age: 56
End: 2021-03-22

## 2021-03-22 DIAGNOSIS — E78.5 HYPERLIPIDEMIA: ICD-10-CM

## 2021-03-22 RX ORDER — ATORVASTATIN CALCIUM 20 MG/1
TABLET, FILM COATED ORAL
Qty: 90 TABLET | Refills: 3 | Status: SHIPPED | OUTPATIENT
Start: 2021-03-22 | End: 2022-02-22

## 2021-05-19 ENCOUNTER — OFFICE VISIT - HEALTHEAST (OUTPATIENT)
Dept: FAMILY MEDICINE | Facility: CLINIC | Age: 56
End: 2021-05-19

## 2021-05-19 DIAGNOSIS — G89.29 CHRONIC BILATERAL LOW BACK PAIN WITHOUT SCIATICA: ICD-10-CM

## 2021-05-19 DIAGNOSIS — M54.50 CHRONIC BILATERAL LOW BACK PAIN WITHOUT SCIATICA: ICD-10-CM

## 2021-05-19 DIAGNOSIS — E78.5 HYPERLIPIDEMIA, UNSPECIFIED HYPERLIPIDEMIA TYPE: ICD-10-CM

## 2021-05-19 DIAGNOSIS — E11.9 TYPE 2 DIABETES MELLITUS WITHOUT COMPLICATION, WITHOUT LONG-TERM CURRENT USE OF INSULIN (H): ICD-10-CM

## 2021-05-19 DIAGNOSIS — I10 ESSENTIAL HYPERTENSION: ICD-10-CM

## 2021-05-19 DIAGNOSIS — R79.89 ABNORMAL LFTS: ICD-10-CM

## 2021-05-19 LAB
ALBUMIN SERPL-MCNC: 4.2 G/DL (ref 3.5–5)
ALP SERPL-CCNC: 99 U/L (ref 45–120)
ALT SERPL W P-5'-P-CCNC: 63 U/L (ref 0–45)
ANION GAP SERPL CALCULATED.3IONS-SCNC: 14 MMOL/L (ref 5–18)
AST SERPL W P-5'-P-CCNC: 50 U/L (ref 0–40)
BILIRUB SERPL-MCNC: 1.1 MG/DL (ref 0–1)
BUN SERPL-MCNC: 16 MG/DL (ref 8–22)
CALCIUM SERPL-MCNC: 9.8 MG/DL (ref 8.5–10.5)
CHLORIDE BLD-SCNC: 101 MMOL/L (ref 98–107)
CO2 SERPL-SCNC: 24 MMOL/L (ref 22–31)
CREAT SERPL-MCNC: 1.06 MG/DL (ref 0.7–1.3)
GFR SERPL CREATININE-BSD FRML MDRD: >60 ML/MIN/1.73M2
GLUCOSE BLD-MCNC: 180 MG/DL (ref 70–125)
HBA1C MFR BLD: 9.6 %
POTASSIUM BLD-SCNC: 4.4 MMOL/L (ref 3.5–5)
PROT SERPL-MCNC: 7.4 G/DL (ref 6–8)
SODIUM SERPL-SCNC: 139 MMOL/L (ref 136–145)

## 2021-05-19 RX ORDER — DIAZEPAM 10 MG
5-10 TABLET ORAL EVERY 8 HOURS PRN
Qty: 60 TABLET | Refills: 2 | Status: SHIPPED | OUTPATIENT
Start: 2021-05-19 | End: 2022-01-21

## 2021-05-22 ENCOUNTER — HEALTH MAINTENANCE LETTER (OUTPATIENT)
Age: 56
End: 2021-05-22

## 2021-05-27 VITALS
SYSTOLIC BLOOD PRESSURE: 128 MMHG | OXYGEN SATURATION: 95 % | DIASTOLIC BLOOD PRESSURE: 80 MMHG | HEART RATE: 98 BPM | WEIGHT: 232 LBS

## 2021-05-28 NOTE — PROGRESS NOTES
Assessment/Plan:    1. Diabetes mellitus, type 2 (H)  Type 2 diabetes, fair control with A1c increasing slightly from 6.9% up to 7.2% over the holidays.  Continues glipizide metformin 2.5/500 using 2 tablets twice daily.  Weight goal remains less than 220 pounds initially, less than 210 pounds ideally.  Reassess at follow-up physical exam in 4 months.  - Glycosylated Hemoglobin A1c  - Comprehensive Metabolic Panel    2. Essential hypertension  Hypertension, stable.  Continues losartan 50 mg daily.  - Comprehensive Metabolic Panel    3. Hyperlipidemia, unspecified hyperlipidemia type  Remains on atorvastatin 20 mg daily with hyperlipidemia.  Follow LFTs with history of mild elevation likely combination statin therapy and fatty liver.  - Comprehensive Metabolic Panel  - Lipid Cascade    4. Tubular adenoma of colon  Tubular adenoma history with prior colonoscopy July 24, 2014 with recommendation for 5-year repeat.  - Ambulatory referral for Colonoscopy    5. Right lateral epicondylitis  Right lateral epicondylitis.  Tennis elbow band to be utilized.  Avoidance of exacerbating triggers.    6. SOB (shortness of breath)  Cough and shortness of breath described.  Likely component of deconditioning.  Chest x-ray and electrocardiogram performed today.  EKG normal.  Chest x-ray negative.  Therapeutic lifestyle changes reviewed for weight goal less than 220 pounds initially, less than 210 pounds ideally.  - XR Chest 2 Views  - Electrocardiogram Perform and Read    7. Cough  As above.   Stable.  We will continue to monitor.  Chest x-ray negative.  Question component of losartan side effect.  Patient elects to monitor.  - XR Chest 2 Views      The following high BMI interventions were performed this visit: encouragement to exercise, weight monitoring, weight loss from baseline weight and lifestyle education regarding diet.  Ensure ongoing efforts to achieve weight goal < 220 pounds initially, < 210 pounds ideally.  "        Subjective:    Tyrese Pineda is seen today for follow-up evaluation.  Type 2 diabetes.  Had switch from metformin to glipizide metformin 2.5/500 using 2 tablets twice daily after prior A1c of 8.7% noted.  A1c improved to 6.9% December 14, 2018.  2 pound weight gain over the holidays with recent dietary indiscretions over [facility].  Continues losartan 50 mg daily with improvement in cough since discontinuing lisinopril however still has persistent intermittent cough, nonproductive.  Some shortness of breath and heavy breathing described more at rest which appears to be more baseline without exertional component.  Denies history of asthma.  Non-smoker.  Continues losartan for hypertension and atorvastatin for lipid management.  Right elbow pain noted.  Right handed.  Consistent with lateral epicondylitis and does use tennis elbow band historically however lost his tennis elbow band apparently.  Denies orthopnea or PND symptoms.  No exertional chest pain.  Comprehensive review of systems as above otherwise all negative.     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 lupus  Sister and nephew with Celiac disease   No smoke   EtOH: [employer] \"1-2/night\" occ 4 on weekend if party, etc.   Surgeries: 3/00 - bilateral Lasik eye surgery; bilateral inguinal hernia repair 5/2014 (Dr. Edward Merino)  12/9/05 - Jermaine's (pneumonia)   Pneumovax 12/05   Tdap 6/25/08  [employer]     Past Surgical History:   Procedure Laterality Date     HERNIA REPAIR Bilateral May, 2014    Dr. Edward ASCENCIO Bilateral         Family History   Problem Relation Age of Onset     Celiac disease Mother      Heart disease Father         Past Medical History:   Diagnosis Date     Diabetes mellitus (H)      Hypertension         Social History     Tobacco Use     Smoking status: Former Smoker     Smokeless tobacco: Never Used "   Substance Use Topics     Alcohol use: Yes     Drug use: No        Current Outpatient Medications   Medication Sig Dispense Refill     aspirin 81 MG EC tablet Take 81 mg by mouth daily.       atorvastatin (LIPITOR) 20 MG tablet TAKE 1 TABLET BY MOUTH ONCE DAILY 90 tablet 1     desoximetasone (TOPICORT) 0.05 % cream APPLY SPARINGLY TO AFFECTED AREA(S) TWICE DAILY 60 g 5     diazePAM (VALIUM) 10 MG tablet Take 0.5-1 tablets (5-10 mg total) by mouth every 8 (eight) hours as needed (muscle spasm). 60 tablet 2     glipiZIDE-metFORMIN (METAGLIP) 2.5-500 mg per tablet TAKE 2 TABLETS BY MOUTH TWO TIMES A DAY BEFORE MEALS. 360 tablet 1     losartan (COZAAR) 50 MG tablet TAKE ONE TABLET BY MOUTH ONE TIME DAILY 90 tablet 3     losartan (COZAAR) 50 MG tablet TAKE ONE TABLET BY MOUTH ONE TIME DAILY 90 tablet 1     omeprazole (PRILOSEC) 20 MG capsule Take 20 mg by mouth daily.       oxyCODONE-acetaminophen (PERCOCET) 5-325 mg per tablet Take 1 tablet by mouth every 6 (six) hours as needed for pain (TAKE 1-2 TABLETS EVERY 4-6 HOURS AS NEEDED FOR PAIN). 30 tablet 0     No current facility-administered medications for this visit.           Objective:    Vitals:    04/23/19 0700   BP: 136/78   Pulse: 94   SpO2: 95%   Weight: (!) 227 lb (103 kg)      Body mass index is 31.66 kg/m .    Alert.  No apparent distress.  Chest clear.  Cardiac exam regular without cardiac ectopy or murmur.  Abdomen benign, obese.  BMI 31.66.  Extremities warm and dry.  No peripheral edema.      EKG - normal      EXAM: XR CHEST 2 VIEWS  LOCATION: [facility]  DATE/TIME: 4/23/2019 7:47 AM     INDICATION: Shortness of breath  COMPARISON: 12/09/2005     FINDINGS: Heart size and pulmonary vessels are normal. Mild ectasia of ascending aorta likely lungs are clear.        This note has been dictated using voice recognition software and as a result may contain minor grammatical errors and unintended word substitutions.

## 2021-05-28 NOTE — TELEPHONE ENCOUNTER
Refill Approved    Rx renewed per Medication Renewal Policy. Medication was last renewed on 10/22/2018.    Lilia Martinez, [facility] Connection Triage/Med Refill 4/29/2019     Requested Prescriptions   Pending Prescriptions Disp Refills     atorvastatin (LIPITOR) 20 MG tablet [Pharmacy Med Name: ATORVASTATIN 20 MG TABLET] 90 tablet 1     Sig: TAKE 1 TABLET BY MOUTH EVERY DAY       Statins Refill Protocol (Hmg CoA Reductase Inhibitors) Passed - 4/27/2019 12:16 AM        Passed - PCP or prescribing provider visit in past 12 months      Last office visit with prescriber/PCP: 4/23/2019 Luis Fernando Madrid MD OR same dept: 4/23/2019 Luis Fernando Madrid MD OR same specialty: 4/23/2019 Luis Fernando Madrid MD  Last physical: 8/21/2018 Last MTM visit: Visit date not found   Next visit within 3 mo: Visit date not found  Next physical within 3 mo: Visit date not found  Prescriber OR PCP: Luis Fernando Madrid MD  Last diagnosis associated with med order: 1. Hyperlipidemia  - atorvastatin (LIPITOR) 20 MG tablet [Pharmacy Med Name: ATORVASTATIN 20 MG TABLET]; TAKE 1 TABLET BY MOUTH EVERY DAY  Dispense: 90 tablet; Refill: 1    If protocol passes may refill for 12 months if within 3 months of last provider visit (or a total of 15 months).

## 2021-05-29 ENCOUNTER — RECORDS - HEALTHEAST (OUTPATIENT)
Dept: ADMINISTRATIVE | Facility: CLINIC | Age: 56
End: 2021-05-29

## 2021-05-30 VITALS — BODY MASS INDEX: 30.44 KG/M2 | WEIGHT: 226 LBS

## 2021-05-30 NOTE — TELEPHONE ENCOUNTER
Refill Approved    Rx renewed per Medication Renewal Policy. Medication was last renewed on 11/27/18.    Yu Britton, [facility] Connection Triage/Med Refill 7/24/2019     Requested Prescriptions   Pending Prescriptions Disp Refills     glipiZIDE-metFORMIN (METAGLIP) 2.5-500 mg per tablet [Pharmacy Med Name: GLIPIZIDE-METFORMIN 2.5-500 MG] 360 tablet 1     Sig: TAKE 2 TABLETS BY MOUTH TWO TIMES A DAY BEFORE MEALS.       Metformin Refill Protocol Passed - 7/24/2019  1:22 AM        Passed - Blood pressure in last 12 months     BP Readings from Last 1 Encounters:   04/23/19 136/78             Passed - LFT or AST or ALT in last 12 months     Albumin   Date Value Ref Range Status   04/23/2019 4.1 3.5 - 5.0 g/dL Final     Bilirubin, Total   Date Value Ref Range Status   04/23/2019 0.9 0.0 - 1.0 mg/dL Final     Bilirubin, Direct   Date Value Ref Range Status   06/29/2011 0.2 <0.6 mg/dL Final     Alkaline Phosphatase   Date Value Ref Range Status   04/23/2019 93 45 - 120 U/L Final     AST   Date Value Ref Range Status   04/23/2019 36 0 - 40 U/L Final     ALT   Date Value Ref Range Status   04/23/2019 48 (H) 0 - 45 U/L Final     Protein, Total   Date Value Ref Range Status   04/23/2019 7.1 6.0 - 8.0 g/dL Final                Passed - GFR or Serum Creatinine in last 6 months     GFR MDRD Non Af Amer   Date Value Ref Range Status   04/23/2019 >60 >60 mL/min/1.73m2 Final     GFR MDRD Af Amer   Date Value Ref Range Status   04/23/2019 >60 >60 mL/min/1.73m2 Final             Passed - Visit with PCP or prescribing provider visit in last 6 months or next 3 months     Last office visit with prescriber/PCP: 4/23/2019 OR same dept: 4/23/2019 Luis Fernando Madrid MD OR same specialty: 4/23/2019 Luis Fernando Madrid MD Last physical: Visit date not found Last MTM visit: Visit date not found         Next appt within 3 mo: Visit date not found  Next physical within 3 mo: Visit date not found  Prescriber OR PCP: Luis Fernando Madrid MD  Last diagnosis  associated with med order: 1. Diabetes mellitus, type 2 (H)  - glipiZIDE-metFORMIN (METAGLIP) 2.5-500 mg per tablet [Pharmacy Med Name: GLIPIZIDE-METFORMIN 2.5-500 MG]; TAKE 2 TABLETS BY MOUTH TWO TIMES A DAY BEFORE MEALS.  Dispense: 360 tablet; Refill: 1     If protocol passes may refill for 12 months if within 3 months of last provider visit (or a total of 15 months).           Passed - A1C in last 6 months     Hemoglobin A1c   Date Value Ref Range Status   04/23/2019 7.2 (H) 3.5 - 6.0 % Final               Passed - Microalbumin in last year      Microalbumin, Random Urine   Date Value Ref Range Status   08/21/2018 0.97 0.00 - 1.99 mg/dL Final

## 2021-05-30 NOTE — TELEPHONE ENCOUNTER
Refill Approved    Rx renewed per Medication Renewal Policy. Medication was last renewed on 1/27/19.    Araceli Maravilla, [facility] Connection Triage/Med Refill 7/14/2019     Requested Prescriptions   Pending Prescriptions Disp Refills     losartan (COZAAR) 50 MG tablet [Pharmacy Med Name: LOSARTAN POTASSIUM 50 MG TAB] 90 tablet 1     Sig: TAKE 1 TABLET BY MOUTH EVERY DAY       Angiotensin Receptor Blocker Protocol Passed - 7/14/2019  8:35 AM        Passed - PCP or prescribing provider visit in past 12 months       Last office visit with prescriber/PCP: 4/23/2019 Luis Fernando Madrid MD OR same dept: 4/23/2019 Luis Fernando Madrid MD OR same specialty: 4/23/2019 Luis Fernando Madrid MD  Last physical: 8/21/2018 Last MTM visit: Visit date not found   Next visit within 3 mo: Visit date not found  Next physical within 3 mo: Visit date not found  Prescriber OR PCP: Luis Fernando Madrid MD  Last diagnosis associated with med order: 1. HTN (hypertension)  - losartan (COZAAR) 50 MG tablet [Pharmacy Med Name: LOSARTAN POTASSIUM 50 MG TAB]; TAKE 1 TABLET BY MOUTH EVERY DAY  Dispense: 90 tablet; Refill: 1    If protocol passes may refill for 12 months if within 3 months of last provider visit (or a total of 15 months).             Passed - Serum potassium within the past 12 months     Lab Results   Component Value Date    Potassium 4.5 04/23/2019             Passed - Blood pressure filed in past 12 months     BP Readings from Last 1 Encounters:   04/23/19 136/78             Passed - Serum creatinine within the past 12 months     Creatinine   Date Value Ref Range Status   04/23/2019 0.94 0.70 - 1.30 mg/dL Final

## 2021-05-30 NOTE — TELEPHONE ENCOUNTER
RN cannot approve Refill Request    RN can NOT refill this medication med is not covered by policy/route to provider. Last office visit: 4/23/2019 Luis Fernando Madrid MD Last Physical: 8/21/2018 Last MTM visit: Visit date not found Last visit same specialty: 4/23/2019 Luis Fernando Madrid MD.  Next visit within 3 mo: Visit date not found  Next physical within 3 mo: Visit date not found      Marguerite Ruiz, [facility] Connection Triage/Med Refill 7/16/2019    Requested Prescriptions   Pending Prescriptions Disp Refills     desoximetasone (TOPICORT) 0.05 % cream [Pharmacy Med Name: DESOXIMETASONE 0.05% CREAM] 60 g 5     Sig: APPLY SPARINGLY TO AFFECTED AREA TWICE A DAY       There is no refill protocol information for this order

## 2021-05-31 VITALS — HEIGHT: 71 IN | WEIGHT: 224 LBS | BODY MASS INDEX: 31.36 KG/M2

## 2021-05-31 VITALS — WEIGHT: 230.6 LBS | HEIGHT: 71 IN | BODY MASS INDEX: 32.28 KG/M2

## 2021-05-31 VITALS — BODY MASS INDEX: 31.52 KG/M2 | WEIGHT: 226 LBS

## 2021-05-31 NOTE — TELEPHONE ENCOUNTER
CC: L Knee pain (more than R knee)     >  Over the past week or so   > No injuries - more from work around house - kneeling to do work at home       > Not swollen        > No fever       At home   > IBU        > He has appt for the 27th and would like to try some at home care first         A/P:   > Would suggest appt but can try some at home care at your request     > Reviewed / discussed RICE approach   > Call back if this is worsening - gaby fever or swelling and can see about sooner appt         Yimi Singh, RN   Triage and Medication Refills                 Reason for Disposition    MODERATE pain (e.g., symptoms interfere with work or school, limping) and present > 3 days    Protocols used: KNEE PAIN-A-OH

## 2021-05-31 NOTE — PROGRESS NOTES
Assessment/Plan:     1. Routine general medical examination at a health care facility  Routine healthcare maintenance.  Preventative cares reviewed.  Immunizations reviewed and up-to-date.  Annual physical exams to continue.    2. Class 1 obesity due to excess calories with serious comorbidity and body mass index (BMI) of 31.0 to 31.9 in adult  Dietary and exercise modification for weight goal remaining less than 220 pounds initially, less than 210 pounds ideally.    3. Type 2 diabetes mellitus without complication, without long-term current use of insulin (H)  Type 2 diabetes remaining relatively stable with glipizide metformin 2.5/500 using 2 tablets twice daily with slow increase over past several visits however high of 8.7% previously recognized.  Continue regular exercise and dietary modifications as discussed for weight goal as outlined above.  Microalbumin screen to be updated today.  Lab monitoring.  - Glycosylated Hemoglobin A1c  - Comprehensive Metabolic Panel  - Microalbumin, Random Urine    4. Essential hypertension  Hypertension, stable.  Blood pressure 134/86 on recheck.  Losartan 50 mg daily continues.  Reassess at follow-up in 4 months.  - Comprehensive Metabolic Panel    5. Hyperlipidemia, unspecified hyperlipidemia type  Patient requests recheck lipid cascade while fasting.  Atorvastatin 20 mg daily.  Consider increasing to 40 mg high intensity dose if LDL greater than 70.  - Lipid Cascade    6. Tubular adenoma of colon  History of tubular adenoma.  Recent colonoscopy August 2, 2019 with tubular adenoma noted as well.  Continue 5-year follow-up recommendations.    7. Gastroesophageal reflux disease without esophagitis  Omeprazole 20 mg daily.    8. Acute pain of left knee  Patient seen by [facility] for left knee pain over past 2 weeks with slow improvement.  Using diclofenac 50 mg twice daily as needed.    9. Abnormal LFTs  Has had mild LFT elevation including ALT likely hepatic  "steatosis etiology.  Weight loss recommendations as noted above.  - Comprehensive Metabolic Panel    10. Snoring  Has seen Dr. Dye previously for sleep evaluation.  Patient declines at this time due to cost.  Instructed patient to decrease alcohol before bed as well as benefits of weight loss.  Reassess at follow-up.       The following high BMI interventions were performed this visit: encouragement to exercise, weight monitoring, weight loss from baseline weight and lifestyle education regarding diet          Subjective:      Tyrese Pineda is a 54 y.o. male who presents for an annual exam.  In general doing well.  Recent left knee pain x2 weeks.  Seen through [facility].  Prescribed diclofenac 50 mg twice daily as needed.  Knee slowly improving.  Wearing a knee brace at work.  Continues management for type 2 diabetes.  Glipizide metformin 2.5/500 using 2 tablets twice daily.  Losartan 50 mg daily for hypertension.  Atorvastatin 20 mg daily for lipid management.  Has had prior tubular adenoma on colonoscopy.  Colonoscopy repeated August 2, 2019 with additional tubular adenoma noted.  Told to repeat at 5-year interval.  Does not have any significant shortness of breath at this time.  Cough still however minimal.  Question secondary to losartan versus other.  Prior evaluation and office visit April 23, 2019 with EKG and chest x-ray appearing normal.  Omeprazole 20 mg daily for reflux.  Comprehensive review of systems as above otherwise all negative.     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 lupus  Sister and nephew with Celiac disease   No smoke   EtOH: [employer] \"1-2/night\" occ 4 on weekend if party, etc.   Surgeries: 3/00 - bilateral Lasik eye surgery; bilateral inguinal hernia repair 5/2014 (Dr. Edward Merino)  12/9/05 - Jermaine's (pneumonia)   Pneumovax 12/05   Tdap 6/25/08  [employer] "     Healthy Habits:   Regular Exercise: No  Healthy Diet: No  Dental Visits Regularly: Yes  Seat Belt: Yes   Sexually active: Yes  Colonoscopy: Yes and 8/2/19 (repeat in 5 years with h/o tubular adenoma)  Lipid Profile: Yes  Glucose Screen: Yes    Immunization History   Administered Date(s) Administered     DT (pediatric) 01/01/1993, 04/02/2004     Hep A, Adult IM (19yr & older) 05/19/2010, 05/04/2011     Hep A, historic 05/19/2010, 05/04/2011     INFLUENZA,RECOMBINANT,INJ,PF QUADRIVALENT 18+YRS 12/14/2018     Influenza, inj, historic,unspecified 10/01/2015, 10/11/2016     Influenza, seasonal,quad inj 6-35 mos 09/17/2010, 09/26/2012     Influenza,seasonal quad, PF 12/18/2013, 11/05/2014     Influenza,seasonal quad, PF, =/> 6months 12/18/2013, 11/05/2014     Influenza,seasonal, Inj IIV3 09/17/2010, 09/26/2012     Influenza,seasonal,quad inj =/> 6months 10/01/2015, 10/11/2016, 11/21/2017     Pneumo Polysac 23-V 08/15/2014     Td, Adult, Absorbed 01/01/1993, 04/02/2004     Td,adult,historic,unspecified 01/01/1993, 04/02/2004     Tdap 06/25/2008, 09/07/2011     ZOSTER, RECOMBINANT, IM 08/21/2018, 12/14/2018     Immunization status: up to date and documented.  Vision Screening:both eyes  Hearing: PASS     Current Outpatient Medications   Medication Sig Dispense Refill     aspirin 81 MG EC tablet Take 81 mg by mouth daily.       atorvastatin (LIPITOR) 20 MG tablet Take 1 tablet (20 mg total) by mouth daily. 90 tablet 3     desoximetasone (TOPICORT) 0.05 % cream APPLY SPARINGLY TO AFFECTED AREA TWICE A DAY 60 g 5     diazePAM (VALIUM) 10 MG tablet Take 0.5-1 tablets (5-10 mg total) by mouth every 8 (eight) hours as needed (muscle spasm). 60 tablet 2     diclofenac (VOLTAREN) 50 MG EC tablet TAKE 1 TABLET TWICE DAILY AS NEEDED PAIN TAKE WITH FOOD  0     glipiZIDE-metFORMIN (METAGLIP) 2.5-500 mg per tablet TAKE 2 TABLETS BY MOUTH TWO TIMES A DAY BEFORE MEALS. 360 tablet 0     losartan (COZAAR) 50 MG tablet TAKE 1 TABLET BY  MOUTH EVERY DAY 90 tablet 3     omeprazole (PRILOSEC) 20 MG capsule Take 20 mg by mouth daily.       oxyCODONE-acetaminophen (PERCOCET) 5-325 mg per tablet Take 1 tablet by mouth every 6 (six) hours as needed for pain (TAKE 1-2 TABLETS EVERY 4-6 HOURS AS NEEDED FOR PAIN). 30 tablet 0     No current facility-administered medications for this visit.      Past Medical History:   Diagnosis Date     Diabetes mellitus (H)      Hypertension      Past Surgical History:   Procedure Laterality Date     HERNIA REPAIR Bilateral May, 2014    Dr. Edward ASCENCIO Bilateral      Lisinopril  Family History   Problem Relation Age of Onset     Celiac disease Mother      Heart disease Father      Social History     Socioeconomic History     Marital status:      Spouse name: Not on file     Number of children: Not on file     Years of education: Not on file     Highest education level: Not on file   Occupational History     Not on file   Social Needs     Financial resource strain: Not on file     Food insecurity:     Worry: Not on file     Inability: Not on file     Transportation needs:     Medical: Not on file     Non-medical: Not on file   Tobacco Use     Smoking status: Former Smoker     Smokeless tobacco: Never Used   Substance and Sexual Activity     Alcohol use: Yes     Drug use: No     Sexual activity: Not on file   Lifestyle     Physical activity:     Days per week: Not on file     Minutes per session: Not on file     Stress: Not on file   Relationships     Social connections:     Talks on phone: Not on file     Gets together: Not on file     Attends Faith service: Not on file     Active member of club or organization: Not on file     Attends meetings of clubs or organizations: Not on file     Relationship status: Not on file     Intimate partner violence:     Fear of current or ex partner: Not on file     Emotionally abused: Not on file     Physically abused: Not on file     Forced sexual activity: Not on file  "  Other Topics Concern     Not on file   Social History Narrative     Not on file       Review of Systems  Comprehensive ROS: as above, otherwise all negative.           Objective:     /86   Pulse 85   Ht 5' 11\" (1.803 m)   Wt (!) 229 lb (103.9 kg)   SpO2 94%   BMI 31.94 kg/m    Body mass index is 31.94 kg/m .    Physical    General Appearance:    Alert, cooperative, no distress, appears stated age.  Obesity.   Head:    Normocephalic, without obvious abnormality, atraumatic   Eyes:    PERRL, conjunctiva/corneas clear, EOM's intact, fundi     benign, both eyes        Ears:    Normal TM's and external ear canals, both ears   Nose:   Nares normal, septum midline, mucosa normal, no drainage    or sinus tenderness   Throat:   Lips, mucosa, and tongue normal; teeth and gums normal   Neck:   Supple, symmetrical, trachea midline, no adenopathy;        thyroid:  No enlargement/tenderness/nodules; no carotid    bruit or JVD   Back:     Symmetric, no curvature, ROM normal, no CVA tenderness   Lungs:     Clear to auscultation bilaterally, respirations unlabored   Chest wall:    No tenderness or deformity   Heart:    Regular rate and rhythm, S1 and S2 normal, no murmur, rub   or gallop   Abdomen:     Soft, non-tender, bowel sounds active all four quadrants,     no masses, no organomegaly.     Genitalia:    Normal male without lesion, discharge or tenderness.  No inguinal hernia noted.     Rectal:    Normal tone.  Prostate normal/symmetric, no masses or tenderness.   Extremities:   Extremities normal, atraumatic, no cyanosis or edema   Pulses:   2+ and symmetric all extremities   Skin:   Skin color, texture, turgor normal, no rashes or lesions   Lymph nodes:   Cervical, supraclavicular, and axillary nodes normal   Neurologic:   CNII-XII intact. Normal strength, sensation and reflexes       throughout                This note has been dictated using voice recognition software and as a result may contain minor " grammatical errors and unintended word substitutions.

## 2021-06-01 VITALS — BODY MASS INDEX: 31.38 KG/M2 | WEIGHT: 225 LBS

## 2021-06-01 VITALS — HEIGHT: 71 IN | WEIGHT: 225 LBS | BODY MASS INDEX: 31.5 KG/M2

## 2021-06-02 ENCOUNTER — RECORDS - HEALTHEAST (OUTPATIENT)
Dept: ADMINISTRATIVE | Facility: CLINIC | Age: 56
End: 2021-06-02

## 2021-06-02 VITALS — HEIGHT: 71 IN | WEIGHT: 226.6 LBS | BODY MASS INDEX: 31.72 KG/M2

## 2021-06-02 VITALS — WEIGHT: 225 LBS | BODY MASS INDEX: 31.38 KG/M2

## 2021-06-02 NOTE — TELEPHONE ENCOUNTER
Refill Approved    Rx renewed per Medication Renewal Policy. Medication was last renewed on 7/24/19.    Joyce Cuenca, [facility] Triage/Med Refill 10/18/2019     Requested Prescriptions   Pending Prescriptions Disp Refills     glipiZIDE-metFORMIN (METAGLIP) 2.5-500 mg per tablet [Pharmacy Med Name: GLIPIZIDE-METFORMIN 2.5-500 MG] 360 tablet 0     Sig: Take 2 tablets by mouth 2 (two) times a day before meals.       Metformin Refill Protocol Passed - 10/18/2019  1:31 AM        Passed - Blood pressure in last 12 months     BP Readings from Last 1 Encounters:   08/27/19 134/86             Passed - LFT or AST or ALT in last 12 months     Albumin   Date Value Ref Range Status   08/27/2019 4.1 3.5 - 5.0 g/dL Final     Bilirubin, Total   Date Value Ref Range Status   08/27/2019 0.6 0.0 - 1.0 mg/dL Final     Bilirubin, Direct   Date Value Ref Range Status   06/29/2011 0.2 <0.6 mg/dL Final     Alkaline Phosphatase   Date Value Ref Range Status   08/27/2019 87 45 - 120 U/L Final     AST   Date Value Ref Range Status   08/27/2019 72 (H) 0 - 40 U/L Final     ALT   Date Value Ref Range Status   08/27/2019 89 (H) 0 - 45 U/L Final     Protein, Total   Date Value Ref Range Status   08/27/2019 7.2 6.0 - 8.0 g/dL Final                Passed - GFR or Serum Creatinine in last 6 months     GFR MDRD Non Af Amer   Date Value Ref Range Status   08/27/2019 >60 >60 mL/min/1.73m2 Final     GFR MDRD Af Amer   Date Value Ref Range Status   08/27/2019 >60 >60 mL/min/1.73m2 Final             Passed - Visit with PCP or prescribing provider visit in last 6 months or next 3 months     Last office visit with prescriber/PCP: 4/23/2019 OR same dept: 4/23/2019 Luis Fernando Madrid MD OR same specialty: 4/23/2019 Luis Fernando Madrid MD Last physical: 8/27/2019 Last MTM visit: Visit date not found         Next appt within 3 mo: Visit date not found  Next physical within 3 mo: Visit date not found  Prescriber OR PCP: Luis Fernando Madrid MD  Last diagnosis  associated with med order: 1. Diabetes mellitus, type 2 (H)  - glipiZIDE-metFORMIN (METAGLIP) 2.5-500 mg per tablet [Pharmacy Med Name: GLIPIZIDE-METFORMIN 2.5-500 MG]; TAKE 2 TABLETS BY MOUTH TWO TIMES A DAY BEFORE MEALS.  Dispense: 360 tablet; Refill: 0     If protocol passes may refill for 12 months if within 3 months of last provider visit (or a total of 15 months).           Passed - A1C in last 6 months     Hemoglobin A1c   Date Value Ref Range Status   08/27/2019 7.3 (H) 3.5 - 6.0 % Final               Passed - Microalbumin in last year      Microalbumin, Random Urine   Date Value Ref Range Status   08/27/2019 1.01 0.00 - 1.99 mg/dL Final

## 2021-06-03 VITALS — HEIGHT: 71 IN | WEIGHT: 229 LBS | BODY MASS INDEX: 32.06 KG/M2

## 2021-06-03 VITALS — BODY MASS INDEX: 31.66 KG/M2 | WEIGHT: 227 LBS

## 2021-06-04 VITALS
HEIGHT: 71 IN | WEIGHT: 226 LBS | SYSTOLIC BLOOD PRESSURE: 120 MMHG | HEART RATE: 105 BPM | DIASTOLIC BLOOD PRESSURE: 70 MMHG | BODY MASS INDEX: 31.64 KG/M2 | OXYGEN SATURATION: 95 %

## 2021-06-04 VITALS
OXYGEN SATURATION: 96 % | BODY MASS INDEX: 31.92 KG/M2 | HEART RATE: 98 BPM | WEIGHT: 228 LBS | SYSTOLIC BLOOD PRESSURE: 143 MMHG | DIASTOLIC BLOOD PRESSURE: 96 MMHG | HEIGHT: 71 IN

## 2021-06-04 VITALS
SYSTOLIC BLOOD PRESSURE: 138 MMHG | WEIGHT: 224.1 LBS | DIASTOLIC BLOOD PRESSURE: 84 MMHG | OXYGEN SATURATION: 92 % | BODY MASS INDEX: 31.37 KG/M2 | HEART RATE: 99 BPM | HEIGHT: 71 IN

## 2021-06-04 NOTE — PROGRESS NOTES
Assessment/Plan:    1. Type 2 diabetes mellitus without complication, without long-term current use of insulin (H)  Type 2 diabetes, fair control with A1c of 7.4%.  Continues glipizide metformin 2.5/500 using 2 tablets twice daily and will continue with dietary and exercise modification for weight goal less than 210 pounds initially, less than 200 pounds ideally.  Reassess at scheduled follow-up April 30, 2020.  - Glycosylated Hemoglobin A1c  - Comprehensive Metabolic Panel    2. Essential hypertension  Hypertension with fair control blood pressure 138/84 on recheck and continues losartan 50 mg daily.  - Comprehensive Metabolic Panel    3. Hyperlipidemia, unspecified hyperlipidemia type  Continues atorvastatin 20 mg daily with lipid elevation history.  Lipids near goal August 27, 2019 and will reassess in the spring.    4. Class 1 obesity due to excess calories with serious comorbidity and body mass index (BMI) of 31.0 to 31.9 in adult  Dietary and exercise modifications for weight goal less than 210 pounds initially, less than 200 pounds ideally.    5. Abnormal LFTs  LFT elevation history likely secondary to hepatic steatosis.  Ensure stable.  Weight loss modifications recommended as noted above.  - Comprehensive Metabolic Panel    6. Encounter for screening for HIV  HIV screening per age criteria.  - HIV Antigen/Antibody Screening [place]    7. Need for vaccination  Flublok immunization provided today otherwise immunizations appear up-to-date.  - Influenza, Recombinant, Inj, Quadrivalent, PF, 18+YRS      The following high BMI interventions were performed this visit: encouragement to exercise, weight monitoring, weight loss from baseline weight and lifestyle education regarding diet .  Ensure ongoing efforts to achieve weight goal < 210 pounds initially, < 200 pounds ideally.         Subjective:    Tyrese Pineda is seen today for follow-up evaluation.  Type 2 diabetes.  Glipizide metformin 2.5/500 using 2 tablets  "twice daily.  Tolerating well.  No hypoglycemic episodes.  No GI distress.  Remains on losartan 50 mg daily for hypertension atorvastatin 20 mg daily for lipid management.  Has had mild LFT elevation in the past likely secondary to hepatic steatosis concerns.  Needs flu shot.  Has not had HIV screen.  Low risk.  Ongoing knee issues.  See [facility] later this morning for further evaluation consideration for MRI.  Chronic low back pain.  Had been using [employer] previously with 6 foot pool which helped with traction activities etc. for lower back however no longer accessible with local site closing down and [employer] being too expensive etc.  Comprehensive review of systems as above otherwise all negative.     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 lupus  Sister and nephew with Celiac disease   No smoke   EtOH: [employer] \"1-2/night\" occ 4 on weekend if party, etc.   Surgeries: 3/00 - bilateral Lasik eye surgery; bilateral inguinal hernia repair 5/2014 (Dr. Edward Merino)  12/9/05 - [place] (pneumonia)   Pneumovax 12/05   Tdap 6/25/08  [employer]     Past Surgical History:   Procedure Laterality Date     HERNIA REPAIR Bilateral May, 2014    Dr. Edward ASCENCIO Bilateral         Family History   Problem Relation Age of Onset     Celiac disease Mother      Heart disease Father         Past Medical History:   Diagnosis Date     Diabetes mellitus (H)      Hypertension         Social History     Tobacco Use     Smoking status: Former Smoker     Smokeless tobacco: Never Used   Substance Use Topics     Alcohol use: Yes     Drug use: No        Current Outpatient Medications   Medication Sig Dispense Refill     aspirin 81 MG EC tablet Take 81 mg by mouth daily.       atorvastatin (LIPITOR) 20 MG tablet Take 1 tablet (20 mg total) by mouth daily. 90 tablet 3     desoximetasone (TOPICORT) 0.05 % " "cream APPLY SPARINGLY TO AFFECTED AREA TWICE A DAY 60 g 5     diazePAM (VALIUM) 10 MG tablet Take 0.5-1 tablets (5-10 mg total) by mouth every 8 (eight) hours as needed (muscle spasm). 60 tablet 2     diclofenac (VOLTAREN) 50 MG EC tablet TAKE 1 TABLET TWICE DAILY AS NEEDED PAIN TAKE WITH FOOD  0     glipiZIDE-metFORMIN (METAGLIP) 2.5-500 mg per tablet Take 2 tablets by mouth 2 (two) times a day before meals. 360 tablet 3     losartan (COZAAR) 50 MG tablet TAKE 1 TABLET BY MOUTH EVERY DAY 90 tablet 3     omeprazole (PRILOSEC) 20 MG capsule Take 20 mg by mouth daily.       oxyCODONE-acetaminophen (PERCOCET) 5-325 mg per tablet Take 1 tablet by mouth every 6 (six) hours as needed for pain (TAKE 1-2 TABLETS EVERY 4-6 HOURS AS NEEDED FOR PAIN). 30 tablet 0     No current facility-administered medications for this visit.           Objective:    Vitals:    12/31/19 0713 12/31/19 0737   BP: 150/80 138/84   Patient Site: Left Arm    Patient Position: Sitting    Cuff Size: Adult Large    Pulse: 99    SpO2: 92%    Weight: (!) 224 lb 1.6 oz (101.7 kg)    Height: 5' 11\" (1.803 m)       Body mass index is 31.26 kg/m .    Alert.  No apparent distress.  Chest clear.  Cardiac exam regular.  Monofilament testing bilateral feet normal today with excellent pulses.  No edema.  No significant foot callus or deformity identified.  Blood pressure on recheck 138/84.      This note has been dictated using voice recognition software and as a result may contain minor grammatical errors and unintended word substitutions.   "

## 2021-06-05 VITALS
WEIGHT: 236 LBS | HEART RATE: 83 BPM | TEMPERATURE: 98.4 F | SYSTOLIC BLOOD PRESSURE: 140 MMHG | BODY MASS INDEX: 33.38 KG/M2 | DIASTOLIC BLOOD PRESSURE: 90 MMHG | OXYGEN SATURATION: 95 %

## 2021-06-05 VITALS
TEMPERATURE: 98.1 F | BODY MASS INDEX: 32.68 KG/M2 | HEART RATE: 109 BPM | OXYGEN SATURATION: 93 % | SYSTOLIC BLOOD PRESSURE: 134 MMHG | WEIGHT: 231 LBS | DIASTOLIC BLOOD PRESSURE: 88 MMHG

## 2021-06-05 NOTE — TELEPHONE ENCOUNTER
Patient already scheduled for 01/24. Patient advised via phone to keep this appointment as it is necessary.

## 2021-06-05 NOTE — TELEPHONE ENCOUNTER
Outbound call to follow up on the patients desire to change sleep providers. Reason for the change is that the [facility] has closed and the [facility] sleep labs that are available are not convenient in terms of travel and geographic location.    Per patient please transfer records to:    [facility]   [phone] Attn: Dr. Johnson

## 2021-06-05 NOTE — TELEPHONE ENCOUNTER
PATIENT WILL CALL BACK FOR ORDER TO BE SENT TO ANOTHER LAB, DOES NOT WANT TO GO TO ANY OF OUR LOCATIONS OFFERED. 2/6/20

## 2021-06-05 NOTE — PROGRESS NOTES
Sleep study order, face sheet, signed office visit note, insurance card, photo ID, and consent for service form have been sent to [place] today 2/5/2020

## 2021-06-05 NOTE — PROGRESS NOTES
[facility]  Outpatient Sleep Medicine Encounter, Established Patient    Name: Tyrese Pineda       MRN# [id number]  YOB: 1965    Age: 54 y.o.         Date of Visit: 2/5/2020   Primary care provider: Luis Fernando Madrid MD        Assessment and Plan:    1. Snoring  Patient is being evaluated for Obstructive Sleep Apnea (JAMAR).  Risk factors for JAMAR include:  snoring, witnessed apneas, high blood pressure, obesity, age > 50, neck circumference, and male gender (STOP BANG score = 6/8).  An attended polysomnogram study (split-night PSG) is recommended as risk for JAMAR is low/intermediate. Counseling included a comprehensive review of diagnostic and therapeutic strategies as well as risks of inadequate therapy. Patient has elected to follow up with me in approximately two weeks after the sleep study has been competed to review the results and discuss plan of care.     2. Benign essential hypertension  JAMAR is an independent risk factor for hypertension; risk for hypertension is related to severity of sleep apnea and episodic nocturnal hypoxemia.  Untreated JAMAR can cause loss of blood pressure dipping during nighttime sleep.  PAP therapy can improve blood pressure control in individuals with co-existing JAMAR and hypertension.    3. Type 2 diabetes mellitus without complication, without long-term current use of insulin (H)  Untreated sleep apnea can interfere with diabetes management; once sleep apnea is treated HbA1c may improve.           Chief Complaint: Sleep study    History of Present Illness:      He is a 54 y.o. y/o male patient who returns to the clinic to pursue an overnight sleep study at the behest of his primary care physician.  His most recent visit was on October 15, 2018.  He has loud snoring, witnessed apneas, and uncontrolled diabetes and hypertension.  He reports his most recent HbA1c was 7.4%.  Today his blood pressure is elevated at 147/95.  Patient continues to  consume 3-4 alcoholic beverages per night.  He has class I obesity with a BMI of 32.3 kg/m .  He reports anticipating an arthroscopic knee procedure to alleviate pain.    Patient is ready to pursue sleep apnea testing.  I reviewed the pathophysiology of obstructive sleep apnea in detail and also discussed the mechanism of CPAP therapy.  I advised him that treatment of sleep apnea often has benefits on diabetes and high blood pressure control.        Medications:  Current Outpatient Medications   Medication Sig Dispense Refill     aspirin 81 MG EC tablet Take 81 mg by mouth daily.       atorvastatin (LIPITOR) 20 MG tablet Take 1 tablet (20 mg total) by mouth daily. 90 tablet 3     desoximetasone (TOPICORT) 0.05 % cream APPLY SPARINGLY TO AFFECTED AREA TWICE A DAY 60 g 5     diclofenac (VOLTAREN) 50 MG EC tablet TAKE 1 TABLET TWICE DAILY AS NEEDED PAIN TAKE WITH FOOD  0     glipiZIDE-metFORMIN (METAGLIP) 2.5-500 mg per tablet Take 2 tablets by mouth 2 (two) times a day before meals. 360 tablet 3     hydrOXYzine HCl (ATARAX) 25 MG tablet        losartan (COZAAR) 50 MG tablet TAKE 1 TABLET BY MOUTH EVERY DAY 90 tablet 3     omeprazole (PRILOSEC) 20 MG capsule Take 20 mg by mouth daily.       diazePAM (VALIUM) 10 MG tablet Take 0.5-1 tablets (5-10 mg total) by mouth every 8 (eight) hours as needed (muscle spasm). 60 tablet 2     oxyCODONE-acetaminophen (PERCOCET) 5-325 mg per tablet Take 1 tablet by mouth every 6 (six) hours as needed for pain (TAKE 1-2 TABLETS EVERY 4-6 HOURS AS NEEDED FOR PAIN). 30 tablet 0     No current facility-administered medications for this visit.           Allergies   Allergen Reactions     Lisinopril Cough         Review of Systems:  Review of systems was negative other than as described in HPI.      Past Medical History:  Past Medical History:   Diagnosis Date     Diabetes mellitus (H)      Hypertension           Past Surgical History:   Past Surgical History:   Procedure Laterality Date      "HERNIA REPAIR Bilateral May, 2014    Dr. Edward ASCENCIO Bilateral          Physical Examination:  BP (!) 147/95 (Patient Site: Right Arm, Patient Position: Sitting, Cuff Size: Adult Large)   Pulse 98   Ht 5' 10.5\" (1.791 m)   Wt (!) 228 lb (103.4 kg)   SpO2 96%   BMI 32.25 kg/m     Constitutional: Awake, alert, cooperative, in no apparent distress  Head: Normocephalic, atraumatic.  Neurologic: Alert, oriented, no focal neurological deficit observed  Psychological: euthymic; affect appropriate  Eyes: No icterus  Gait:  Normal  Psychiatric: Affect normal.  Mood pleasant.        Sarah Johnson MD   2/5/2020   [facility]  [address], [address]  [place]  [address]    Copy to: Luis Fernando Madrid MD             "

## 2021-06-05 NOTE — PROGRESS NOTES
Preoperative Exam    Scheduled Procedure: left knee medial meniscus tear with scheduled arthroscopy  Surgery Date:  1/27/2020  Surgery Location: [facility] - fax [phone]    Surgeon:  Dr. Alonso    Assessment/Plan:     1. Preop general physical exam  Preoperative clearance for left knee medial meniscus tear with scheduled arthroscopy noted.  No contraindication to scheduled procedure identified.  Electrocardiogram with single PVC otherwise normal.  - Electrocardiogram Perform and Read    2. Acute medial meniscal injury of left knee, subsequent encounter  Left knee medial meniscus tear with scheduled left knee arthroscopy as noted above.    3. Type 2 diabetes mellitus without complication, without long-term current use of insulin (H)  Type 2 diabetes fair control with A1c of 7.4% and continues glipizide metformin 2.5/500 using 2 tablets twice daily.  Diabetic recheck April 30, 2020.  - Basic Metabolic Panel    4. Essential hypertension  Hypertension stable.  Losartan 50 mg daily to continue.  - Basic Metabolic Panel    5. Hyperlipidemia, unspecified hyperlipidemia type  Hyperlipidemia.  Atorvastatin 20 mg daily.    6. Class 1 obesity due to excess calories with serious comorbidity and body mass index (BMI) of 31.0 to 31.9 in adult  Dietary and exercise modification for weight goal less than 210 pounds initially, less than 200 pounds ideally.    7. Snoring  Snoring history.  Patient should complete sleep study this year to rule out obstructive sleep apnea.  May complete following surgery.  - Ambulatory referral to Sleep Medicine    8. Gastroesophageal reflux disease without esophagitis  Gastroesophageal reflux.  Omeprazole 20 mg daily with benefit noted.    9. Chronic bilateral low back pain without sciatica  Diazepam 10 mg as directed for lower back pain with history of muscle spasm.  Followed by Dr. Segura with [facility] offering nonsurgical options for chronic low back pain.  Completed MRI  January 18, 2020 and awaiting follow-up evaluation in order to review results.  - diazePAM (VALIUM) 10 MG tablet; Take 0.5-1 tablets (5-10 mg total) by mouth every 8 (eight) hours as needed (muscle spasm).  Dispense: 60 tablet; Refill: 2    10. Chronic pain syndrome  Reviewed chronic pain management with patient.        Surgical Procedure Risk: Low (reported cardiac risk generally < 1%)  Have you had prior anesthesia?: Yes  Have you or any family members had a previous anesthesia reaction:  No  Do you or any family members have a history of a clotting or bleeding disorder?: No  Cardiac Risk Assessment: no increased risk for major cardiac complications    Pending review of diagnostic evaluation, APPROVAL GIVEN to proceed with proposed procedure, without further diagnostic evaluation.    Functional Status: Independent  Patient plans to recover at home with family.     Subjective:      Tyrese Pineda is a 54 y.o. male who presents for a preoperative consultation.  Bilateral knee pain.  Scheduled left medial meniscus tear repair through arthroscopy upcoming as noted.  Has underlying type 2 diabetes continues glipizide metformin 2.5/500 using 2 tablets twice daily.  Hypertension remains stable with use of losartan 50 mg daily.  Atorvastatin 20 mg daily for lipid management.  Continues omeprazole 20 mg daily for reflux without breakthrough symptoms.  Does snore.  No known history of sleep apnea.  Does agree to complete sleep study at earliest convenience.  Denies recent illness.  No personal or family history of bleeding disorder.  Comprehensive review of systems as above otherwise all negative.    All other systems reviewed and are negative, other than those listed in the HPI.    Pertinent History  Do you have difficulty breathing or chest pain after walking up a flight of stairs: No  History of obstructive sleep apnea: No  Steroid use in the last 6 months: No  Frequent Aspirin/NSAID use: Yes: 81 mg - stopped  1/23/20  Prior Blood Transfusion: No  Prior Blood Transfusion Reaction: No  If for some reason prior to, during or after the procedure, if it is medically indicated, would you be willing to have a blood transfusion?:  There is no transfusion refusal.    Current Outpatient Medications   Medication Sig Dispense Refill     aspirin 81 MG EC tablet Take 81 mg by mouth daily.       atorvastatin (LIPITOR) 20 MG tablet Take 1 tablet (20 mg total) by mouth daily. 90 tablet 3     desoximetasone (TOPICORT) 0.05 % cream APPLY SPARINGLY TO AFFECTED AREA TWICE A DAY 60 g 5     diazePAM (VALIUM) 10 MG tablet Take 0.5-1 tablets (5-10 mg total) by mouth every 8 (eight) hours as needed (muscle spasm). 60 tablet 2     diclofenac (VOLTAREN) 50 MG EC tablet TAKE 1 TABLET TWICE DAILY AS NEEDED PAIN TAKE WITH FOOD  0     glipiZIDE-metFORMIN (METAGLIP) 2.5-500 mg per tablet Take 2 tablets by mouth 2 (two) times a day before meals. 360 tablet 3     losartan (COZAAR) 50 MG tablet TAKE 1 TABLET BY MOUTH EVERY DAY 90 tablet 3     omeprazole (PRILOSEC) 20 MG capsule Take 20 mg by mouth daily.       oxyCODONE-acetaminophen (PERCOCET) 5-325 mg per tablet Take 1 tablet by mouth every 6 (six) hours as needed for pain (TAKE 1-2 TABLETS EVERY 4-6 HOURS AS NEEDED FOR PAIN). 30 tablet 0     No current facility-administered medications for this visit.         Allergies   Allergen Reactions     Lisinopril Cough       Patient Active Problem List   Diagnosis     Snoring (Symptom)     Joint Pain, Localized In The Right Shoulder     Lateral Epicondylitis     Class 1 obesity due to excess calories with serious comorbidity and body mass index (BMI) of 31.0 to 31.9 in adult     Gastroesophageal reflux disease without esophagitis     Dermatitis     Type 2 diabetes mellitus (H)     Hyperlipidemia, unspecified hyperlipidemia type     Lower Back Pain     Essential Hypertension     Left knee pain     Abnormal LFTs     Overweight (BMI 25.0-29.9)     Tubular adenoma  "of colon     Chronic pain syndrome       Past Medical History:   Diagnosis Date     Diabetes mellitus (H)      Hypertension        Past Surgical History:   Procedure Laterality Date     HERNIA REPAIR Bilateral May, 2014    Dr. Edward ASCENCIO Bilateral        Social History     Socioeconomic History     Marital status:      Spouse name: Not on file     Number of children: Not on file     Years of education: Not on file     Highest education level: Not on file   Occupational History     Not on file   Social Needs     Financial resource strain: Not on file     Food insecurity:     Worry: Not on file     Inability: Not on file     Transportation needs:     Medical: Not on file     Non-medical: Not on file   Tobacco Use     Smoking status: Former Smoker     Smokeless tobacco: Never Used   Substance and Sexual Activity     Alcohol use: Yes     Drug use: No     Sexual activity: Not on file   Lifestyle     Physical activity:     Days per week: Not on file     Minutes per session: Not on file     Stress: Not on file   Relationships     Social connections:     Talks on phone: Not on file     Gets together: Not on file     Attends Sikh service: Not on file     Active member of club or organization: Not on file     Attends meetings of clubs or organizations: Not on file     Relationship status: Not on file     Intimate partner violence:     Fear of current or ex partner: Not on file     Emotionally abused: Not on file     Physically abused: Not on file     Forced sexual activity: Not on file   Other Topics Concern     Not on file   Social History Narrative     Not on file       Patient Care Team:  Luis Fernando Madrid MD as PCP - General  Luis Fernando Madrid MD as Assigned PCP          Objective:     Vitals:    01/24/20 1317   BP: 120/70   Pulse: (!) 105   SpO2: 95%   Weight: (!) 226 lb (102.5 kg)   Height: 5' 10.5\" (1.791 m)         Physical Exam:  Physical Exam     General Appearance:    Alert, cooperative, no " distress, appears stated age.  Obesity.   Head:    Normocephalic, without obvious abnormality, atraumatic   Eyes:    PERRL, conjunctiva/corneas clear, EOM's intact, fundi     benign, both eyes        Ears:    Normal TM's and external ear canals, both ears   Nose:   Nares normal, septum midline, mucosa normal, no drainage    or sinus tenderness   Throat:   Lips, mucosa, and tongue normal; teeth and gums normal   Neck:   Supple, symmetrical, trachea midline, no adenopathy;        thyroid:  No enlargement/tenderness/nodules; no carotid    bruit or JVD   Back:     Symmetric, no curvature, ROM normal, no CVA tenderness   Lungs:     Clear to auscultation bilaterally, respirations unlabored   Chest wall:    No tenderness or deformity   Heart:    Regular rate and rhythm, S1 and S2 normal, no murmur, rub   or gallop   Abdomen:     Soft, non-tender, bowel sounds active all four quadrants,     no masses, no organomegaly.     Genitalia:    deferred   Rectal:    deferred   Extremities:   Extremities normal, atraumatic, no cyanosis or edema.   Pulses:   2+ and symmetric all extremities   Skin:   Skin color, texture, turgor normal, no rashes or lesions   Lymph nodes:   Cervical, supraclavicular, and axillary nodes normal   Neurologic:   CNII-XII intact. Normal strength, sensation and reflexes       throughout        There are no Patient Instructions on file for this visit.    EKG:  NSR with occ PVC.  Otherwise normal EKG    Labs:  Recent Results (from the past 24 hour(s))   Electrocardiogram Perform and Read    Collection Time: 01/24/20  1:25 PM   Result Value Ref Range    SYSTOLIC BLOOD PRESSURE      DIASTOLIC BLOOD PRESSURE      VENTRICULAR RATE 97 BPM    ATRIAL RATE 97 BPM    P-R INTERVAL 158 ms    QRS DURATION 90 ms    Q-T INTERVAL 344 ms    QTC CALCULATION (BEZET) 436 ms    P Axis 30 degrees    R AXIS -8 degrees    T AXIS 21 degrees    MUSE DIAGNOSIS       Sinus rhythm with occasional Premature ventricular  complexes  Otherwise normal ECG  When compared with ECG of 23-APR-2019 07:35,  Premature ventricular complexes are now Present         Immunization History   Administered Date(s) Administered     DT (pediatric) 01/01/1993, 04/02/2004     Hep A, Adult IM (19yr & older) 05/19/2010, 05/04/2011     Hep A, historic 05/19/2010, 05/04/2011     INFLUENZA,RECOMBINANT,INJ,PF QUADRIVALENT 18+YRS 12/14/2018, 12/31/2019     Influenza, inj, historic,unspecified 10/01/2015, 10/11/2016     Influenza, seasonal,quad inj 6-35 mos 09/17/2010, 09/26/2012     Influenza,seasonal quad, PF 12/18/2013, 11/05/2014     Influenza,seasonal quad, PF, =/> 6months 12/18/2013, 11/05/2014     Influenza,seasonal, Inj IIV3 09/17/2010, 09/26/2012     Influenza,seasonal,quad inj =/> 6months 10/01/2015, 10/11/2016, 11/21/2017     Pneumo Polysac 23-V 08/15/2014     Td, Adult, Absorbed 01/01/1993, 04/02/2004     Td,adult,historic,unspecified 01/01/1993, 04/02/2004     Tdap 06/25/2008, 09/07/2011     ZOSTER, RECOMBINANT, IM 08/21/2018, 12/14/2018           Electronically signed by Luis Fernando Madrid MD 01/24/20 1:19 PM

## 2021-06-05 NOTE — TELEPHONE ENCOUNTER
Question following Office Visit  When did you see your provider: 12/31/19  What is your question: Patient stated he is going to get surgery done on his left knee. Patient doesn't know what it's called but he knows they are going to use a scope to look at his left knee. This will occur on 1/27/20. Patient is questioning if the 12/31/19 visit, could be his pre-op physical. Patient stated he is to get an EKG done as well and wants to know if that is necessary for him.    Patient was advised to make an appointment in case Luis Fernando Madrid MD says no. Patient was transferred to scheduling to make an appointment.   Okay to leave a detailed message: Yes

## 2021-06-07 ENCOUNTER — COMMUNICATION - HEALTHEAST (OUTPATIENT)
Dept: FAMILY MEDICINE | Facility: CLINIC | Age: 56
End: 2021-06-07

## 2021-06-07 DIAGNOSIS — L30.9 DERMATITIS: ICD-10-CM

## 2021-06-07 RX ORDER — DESOXIMETASONE 0.5 MG/G
CREAM TOPICAL
Qty: 60 G | Refills: 5 | Status: SHIPPED | OUTPATIENT
Start: 2021-06-07 | End: 2022-01-21

## 2021-06-07 NOTE — TELEPHONE ENCOUNTER
Refill Approved    Rx renewed per Medication Renewal Policy. Medication was last renewed on 4/29/2019         [facility]     Requested Prescriptions   Pending Prescriptions Disp Refills     atorvastatin (LIPITOR) 20 MG tablet [Pharmacy Med Name: ATORVASTATIN 20 MG TABLET] 90 tablet 3     Sig: TAKE 1 TABLET BY MOUTH EVERY DAY       Statins Refill Protocol (Hmg CoA Reductase Inhibitors) Passed - 4/11/2020 12:22 AM        Passed - PCP or prescribing provider visit in past 12 months      Last office visit with prescriber/PCP: 12/31/2019 Luis Fernando Madrid MD OR same dept: 12/31/2019 Luis Fernando Madrid MD OR same specialty: 12/31/2019 Luis Fernando Madrid MD  Last physical: 1/24/2020 Last MTM visit: Visit date not found   Next visit within 3 mo: Visit date not found  Next physical within 3 mo: Visit date not found  Prescriber OR PCP: Luis Fernando Madrid MD  Last diagnosis associated with med order: 1. Hyperlipidemia  - atorvastatin (LIPITOR) 20 MG tablet [Pharmacy Med Name: ATORVASTATIN 20 MG TABLET]; TAKE 1 TABLET BY MOUTH EVERY DAY  Dispense: 90 tablet; Refill: 3    If protocol passes may refill for 12 months if within 3 months of last provider visit (or a total of 15 months).

## 2021-06-08 NOTE — PROGRESS NOTES
"Subjective:    Tyrese Pineda is seen today for follow-up evaluation type 2 diabetes.  5 pound weight gain since November 1, 2016 office visit.  Punch biopsy at that time showing benign hemangioma completely excised.  This healed well.  Continues metformin extended release 500 mg using 2 tablets twice daily.  No GI distress.  Continues atorvastatin 20 mg daily for lipid management as well as losartan 50 mg daily for hypertension and renal protective effects.  Uses Valium and Percocet sparingly for lower back pain, chronic.  Typically only uses at bedtime and does not want to mask the pain during the day.  Has had mild LFT elevation including ALT 48 likely secondary to hepatic steatosis.  Hepatitis screen, CBC, etc. normal in the past as well as GGT level.  Not exercising on a consistent basis.  Does admit to eating at [employer] over lunch hour.     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 lupus  Sister and nephew with Celiac disease   No smoke   EtOH: [employer] \"1-2/night\" occ 4 on weekend if party, etc.   Surgeries: 3/00 - bilateral Lasik eye surgery; bilateral inguinal hernia repair 5/2014 (Dr. Edward Merino)  12/9/05 - [facility] (pneumonia)   Pneumovax 12/05   Tdap 6/25/08  [employer]     Past Surgical History:   Procedure Laterality Date     HERNIA REPAIR Bilateral May, 2014    Dr. Edward ASCENCIO Bilateral         Family History   Problem Relation Age of Onset     Celiac disease Mother      Heart disease Father         Past Medical History:   Diagnosis Date     Diabetes mellitus      Hypertension         Social History   Substance Use Topics     Smoking status: Former Smoker     Smokeless tobacco: Never Used     Alcohol use Yes        Current Outpatient Prescriptions   Medication Sig Dispense Refill     atorvastatin (LIPITOR) 20 MG tablet TAKE ONE TABLET BY MOUTH ONE TIME DAILY 90 " tablet 2     desoximetasone (TOPICORT) 0.05 % cream apply sparingly to affected area twice daily 60 g 10     diazePAM (VALIUM) 10 MG tablet Take 1 tablet (10 mg total) by mouth as needed (TAKE 1/2-1 TABLET - THREE TIMES PER DAY- AS NEEDED FOR MUSCLE SPASMS). 40 tablet 0     losartan (COZAAR) 50 MG tablet TAKE ONE TABLET BY MOUTH ONE TIME DAILY  90 tablet 3     metFORMIN (GLUCOPHAGE-XR) 500 MG 24 hr tablet Take 2 tablets (1,000 mg total) by mouth 2 (two) times a day with meals. 360 tablet 2     omeprazole (PRILOSEC) 20 MG capsule Take 20 mg by mouth daily.       oxyCODONE-acetaminophen (PERCOCET) 5-325 mg per tablet Take 1 tablet by mouth every 6 (six) hours as needed for pain (TAKE 1-2 TABLETS EVERY 4-6 HOURS AS NEEDED FOR PAIN). 30 tablet 0     No current facility-administered medications for this visit.           Objective:    Vitals:    02/14/17 0659   BP: 120/80   Pulse: 80   Weight: (!) 226 lb (102.5 kg)      Body mass index is 30.44 kg/(m^2).    Alert.  No apparent distress.  Skin exam does show well-healed excision site anterior neck at prior site of benign hemangioma excision.  Chest clear.  Cardiac exam regular.  Extremities warm and dry.  BMI 30.5.      Assessment:    1. Type 2 diabetes mellitus  Glycosylated Hemoglobin A1c    Microalbumin, Random Urine    Comprehensive Metabolic Panel   2. Hyperlipidemia, unspecified hyperlipidemia type  Lipid Cascade   3. Essential hypertension with goal blood pressure less than 130/80  Comprehensive Metabolic Panel   4. Abnormal LFTs  Comprehensive Metabolic Panel   5. Obesity           Plan:    A1c did increase from 6.7% to 7.4% with increased weight noted in dietary indiscretions.  Dietary and exercise modifications were reviewed with weight goal less than 210 pounds ideally.  Check microalbumin screen today.  Repeat lipid cascade.  Check comprehensive metabolic panel to ensure stable glycemic control as well as renal function and hepatic function testing.  Anticipate  physical exam in 4 months.

## 2021-06-08 NOTE — PROGRESS NOTES
"Tyrese Pineda is a 55 y.o. male who is being evaluated via a billable telephone visit.      The patient has been notified of following:     \"This telephone visit will be conducted via a call between you and your physician/provider. We have found that certain health care needs can be provided without the need for a physical exam.  This service lets us provide the care you need with a short phone conversation.  If a prescription is necessary we can send it directly to your pharmacy.  If lab work is needed we can place an order for that and you can then stop by our lab to have the test done at a later time.    Telephone visits are billed at different rates depending on your insurance coverage. During this emergency period, for some insurers they may be billed the same as an in-person visit.  Please reach out to your insurance provider with any questions.    If during the course of the call the physician/provider feels a telephone visit is not appropriate, you will not be charged for this service.\"    Patient has given verbal consent to a Telephone visit? Yes    What phone number would you like to be contacted at? [phone]    Patient would like to receive their AVS by AVS Preference: Huang.     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 lupus  Sister and nephew with Celiac disease   No smoke   EtOH: [employer] \"1-2/night\" occ 4 on weekend if party, etc.   Surgeries: 3/00 - bilateral Lasik eye surgery; bilateral inguinal hernia repair 5/2014 (Dr. Edward Merino)  12/9/05 - [facility] (pneumonia)   Pneumovax 12/05   Tdap 6/25/08  [employer]     Additional provider notes: Virtual visit completed today.  In general doing well.  Did undergo left knee medial meniscus tear repair January 27, 2020 followed 3 weeks later by right knee surgery apparently.  Now on Mobic 15 mg daily x30 days for postop pain management. "  Chronic lower back pain and does use diazepam as needed for muscle spasm.  Type 2 diabetes with recent A1c of 7.4% which has shown slow trend over past 3 visits.  Has been perhaps 6 pounds since physical in January for preop clearance.  Less active.  Glipizide metformin 2.5/500 using 2 tablets twice daily.  Losartan 50 mg daily for hypertension.  Atorvastatin 20 mg daily for lipid management.  Patient's wife and other people have described him as a loud breather otherwise no witnessed apneic episodes or known sleep apnea.        Assessment/Plan:    1. Type 2 diabetes mellitus without complication, without long-term current use of insulin (H)  Therapeutic lifestyle changes reviewed for weight goal less than 220 pounds initially, less than 210 pounds ideally with weight this morning 232 pounds while at home.  Continues glipizide metformin 2.5/500 using 2 tablets twice daily and will reassess at physical exam in 3 months.    2. Essential hypertension  Hypertension stable with losartan 50 mg daily.    3. Hyperlipidemia, unspecified hyperlipidemia type  Continues atorvastatin 20 mg daily for lipid management.    4. Chronic bilateral low back pain without sciatica  History of chronic lower back pain with chronic pain syndrome, stable.  Diazepam available for muscle spasm.  Not requiring Percocet for breakthrough pain.    5. Chronic pain syndrome  As above.    6. Gastroesophageal reflux disease without esophagitis  Reflux symptoms have remained stable.        Phone call duration:  14 minutes    Luis Fernando Madrid MD

## 2021-06-09 NOTE — TELEPHONE ENCOUNTER
Refill Approved    Rx renewed per Medication Renewal Policy. Medication was last renewed on 7/14/19.    Yu Britton, [facility] Triage/Med Refill 7/10/2020     Requested Prescriptions   Pending Prescriptions Disp Refills     losartan (COZAAR) 50 MG tablet [Pharmacy Med Name: LOSARTAN POTASSIUM 50 MG TAB] 90 tablet 3     Sig: TAKE 1 TABLET BY MOUTH EVERY DAY       Angiotensin Receptor Blocker Protocol Passed - 7/7/2020  2:41 PM        Passed - PCP or prescribing provider visit in past 12 months       Last office visit with prescriber/PCP: 12/31/2019 Luis Fernando Madrid MD OR same dept: 12/31/2019 Luis Fernando Madrid MD OR same specialty: 12/31/2019 Luis Fernando Madrid MD  Last physical: 1/24/2020 Last MTM visit: Visit date not found   Next visit within 3 mo: Visit date not found  Next physical within 3 mo: Visit date not found  Prescriber OR PCP: Luis Fernando Madrid MD  Last diagnosis associated with med order: 1. HTN (hypertension)  - losartan (COZAAR) 50 MG tablet [Pharmacy Med Name: LOSARTAN POTASSIUM 50 MG TAB]; TAKE 1 TABLET BY MOUTH EVERY DAY  Dispense: 90 tablet; Refill: 3    If protocol passes may refill for 12 months if within 3 months of last provider visit (or a total of 15 months).             Passed - Serum potassium within the past 12 months     Lab Results   Component Value Date    Potassium 4.0 01/24/2020             Passed - Blood pressure filed in past 12 months     BP Readings from Last 1 Encounters:   02/05/20 (!) 143/96             Passed - Serum creatinine within the past 12 months     Creatinine   Date Value Ref Range Status   01/24/2020 1.05 0.70 - 1.30 mg/dL Final

## 2021-06-09 NOTE — TELEPHONE ENCOUNTER
Refill Approved    Rx renewed per Medication Renewal Policy. Medication was last renewed on 7/10/20, last OV 5/20/20.    Sharla Márquez, [facility] Triage/Med Refill 7/25/2020     Requested Prescriptions   Pending Prescriptions Disp Refills     losartan (COZAAR) 50 MG tablet [Pharmacy Med Name: LOSARTAN POTASSIUM 50 MG TAB] 90 tablet 0     Sig: TAKE 1 TABLET BY MOUTH EVERY DAY       Angiotensin Receptor Blocker Protocol Passed - 7/23/2020  1:47 AM        Passed - PCP or prescribing provider visit in past 12 months       Last office visit with prescriber/PCP: 12/31/2019 Luis Fernando Madrid MD OR same dept: 12/31/2019 Luis Fernando Madrid MD OR same specialty: 12/31/2019 Luis Fernando Madrid MD  Last physical: 1/24/2020 Last MTM visit: Visit date not found   Next visit within 3 mo: Visit date not found  Next physical within 3 mo: Visit date not found  Prescriber OR PCP: Luis Fernando Madrid MD  Last diagnosis associated with med order: 1. HTN (hypertension)  - losartan (COZAAR) 50 MG tablet [Pharmacy Med Name: LOSARTAN POTASSIUM 50 MG TAB]; TAKE 1 TABLET BY MOUTH EVERY DAY  Dispense: 90 tablet; Refill: 0    If protocol passes may refill for 12 months if within 3 months of last provider visit (or a total of 15 months).             Passed - Serum potassium within the past 12 months     Lab Results   Component Value Date    Potassium 4.0 01/24/2020             Passed - Blood pressure filed in past 12 months     BP Readings from Last 1 Encounters:   02/05/20 (!) 143/96             Passed - Serum creatinine within the past 12 months     Creatinine   Date Value Ref Range Status   01/24/2020 1.05 0.70 - 1.30 mg/dL Final

## 2021-06-11 NOTE — PROGRESS NOTES
Assessment:     1. Routine general medical examination at a health care facility     2. Non morbid obesity due to excess calories     3. Type 2 diabetes mellitus  Glycosylated Hemoglobin A1c    Comprehensive Metabolic Panel   4. Chronic bilateral low back pain without sciatica  diazePAM (VALIUM) 10 MG tablet   5. Essential hypertension with goal blood pressure less than 130/80  Comprehensive Metabolic Panel   6. Hyperlipidemia, unspecified hyperlipidemia type     7. Abnormal LFTs  Comprehensive Metabolic Panel   8. Gastroesophageal reflux disease without esophagitis     9. Lateral epicondylitis of right elbow     10. Chronic pain syndrome          Plan:      Routine healthcare maintenance.  Preventative cares reviewed.  Diabetes stable with A1c 7.5%.  Continue metformin extended release 500 mg using 2 tablets twice daily.  Dietary and exercise modifications as noted below for further diabetic improvement.  Scheduled diabetic eye exam.  Complete advanced directives reviewed.  Diabetic follow-up in 4 months.  Recent lipid cascade at goal while on atorvastatin 20 mg daily.  Refill on diazepam 10 mg as directed lower back muscle spasm and chronic low back pain.  Does not require refill on Percocet.  Continues losartan 50 mg daily for hypertension management.  Continues omeprazole 20 mg daily for reflux management otherwise breakthrough symptoms as discontinues.  Comprehensive metabolic panel for medication monitoring and history of mild LFT elevation secondary to hepatic steatosis.  Symptomatic care treatments for lateral epicondylitis and activity restrictions reviewed.  Should use seatbelts on a consistent basis.  Dietary improvements with avoidance of fast foods suggested.  Anticipate repeat colonoscopy July 2019.    I have had an Advance Directives discussion with the patient.  The following high BMI interventions were performed this visit: encouragement to exercise, weight monitoring, weight loss from baseline  "weight and lifestyle education regarding diet.  Weight goal < 210 pounds initially, < 200 pounds ideally.         Subjective:      Tyrese Pineda is a 52 y.o. male who presents for an annual exam.  Doing well.  Continues metformin extended release 500 mg using 2 tablets twice daily for diabetes management.  Losartan 50 mg daily for hypertension and renal protective effects with diabetes.  Prior microalbumin screen normal February 14, 2017.  Lipids at goal February 14, 2017 well on atorvastatin 20 mg daily as well.  Tubular adenoma on prior colonoscopy July 24, 2014 and told to repeat 5 year interval.  Omeprazole 20 mg daily for reflux otherwise breakthrough symptoms described.  Goes to the gym 12 times a month primarily for stretching activities.  Last diabetic eye exam likely over a year ago.  Has not completed advanced directives previously.  Right lateral epicondyle tenderness, intermittent.  Nocturia perhaps once per night without disruption with daytime symptoms.  Comprehensive review of systems as above otherwise all negative.     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 lupus  Sister and nephew with Celiac disease   No smoke   EtOH: [employer] \"1-2/night\" occ 4 on weekend if party, etc.   Surgeries: 3/00 - bilateral Lasik eye surgery; bilateral inguinal hernia repair 5/2014 (Dr. Edward Merino)  12/9/05 - [facility] (pneumonia)   Pneumovax 12/05   Tdap 6/25/08  [employer]     Healthy Habits:   Regular Exercise: gym 12 x/month to get reimbursement (stretching activities primarily)  Healthy Diet: No  Dental Visits Regularly: Yes  Seat Belt: No   Sexually active: Yes  Colonoscopy: Yes and 7/24/14 (repeat in 5 years due to tubular adenoma 3mm transverse colon)  Lipid Profile: Yes  Glucose Screen: Yes    Immunization History   Administered Date(s) Administered     Hep A, historic 05/19/2010, 05/04/2011     " Influenza, seasonal,quad inj 36+ mos 10/01/2015, 10/11/2016     Influenza, seasonal,quad inj 6-35 mos 09/17/2010, 09/26/2012     Influenza,seasonal quad, PF 12/18/2013, 11/05/2014     Pneumo Polysac 23-V 08/15/2014     Td, historic 01/01/1993, 04/02/2004     Tdap 06/25/2008     Immunization status: up to date and documented.  Vision Screening:both eyes and needs to schedule a diabetic eye exam  Hearing: PASS     Current Outpatient Prescriptions   Medication Sig Dispense Refill     atorvastatin (LIPITOR) 20 MG tablet TAKE ONE TABLET BY MOUTH ONE TIME DAILY 90 tablet 2     desoximetasone (TOPICORT) 0.05 % cream apply sparingly to affected area twice daily 60 g 10     diazePAM (VALIUM) 10 MG tablet Take 1 tablet (10 mg total) by mouth as needed (TAKE 1/2-1 TABLET - THREE TIMES PER DAY- AS NEEDED FOR MUSCLE SPASMS). 60 tablet 1     losartan (COZAAR) 50 MG tablet TAKE ONE TABLET BY MOUTH ONE TIME DAILY 90 tablet 3     metFORMIN (GLUCOPHAGE-XR) 500 MG 24 hr tablet TAKE 2 TABLETS (1,000 MG TOTAL) BY MOUTH 2 (TWO) TIMES A DAY WITH MEALS. 360 tablet 0     omeprazole (PRILOSEC) 20 MG capsule Take 20 mg by mouth daily.       oxyCODONE-acetaminophen (PERCOCET) 5-325 mg per tablet Take 1 tablet by mouth every 6 (six) hours as needed for pain (TAKE 1-2 TABLETS EVERY 4-6 HOURS AS NEEDED FOR PAIN). 30 tablet 0     desoximetasone (TOPICORT) 0.05 % cream APPLY SPARINGLY TO AFFECTED AREA TWICE DAILY 60 g 3     No current facility-administered medications for this visit.      Past Medical History:   Diagnosis Date     Diabetes mellitus      Hypertension      Past Surgical History:   Procedure Laterality Date     HERNIA REPAIR Bilateral May, 2014    Dr. Edward Merino     LASJOON Bilateral      Lisinopril  Family History   Problem Relation Age of Onset     Celiac disease Mother      Heart disease Father      Social History     Social History     Marital status:      Spouse name: N/A     Number of children: N/A     Years of education:  "N/A     Occupational History     Not on file.     Social History Main Topics     Smoking status: Former Smoker     Smokeless tobacco: Never Used     Alcohol use Yes     Drug use: No     Sexual activity: Not on file     Other Topics Concern     Not on file     Social History Narrative       Review of Systems  Comprehensive ROS: as above, otherwise all negative.           Objective:     /90  Pulse 80  Ht 5' 11\" (1.803 m)  Wt (!) 224 lb (101.6 kg)  BMI 31.24 kg/m2  Body mass index is 31.24 kg/(m^2).    Physical    General Appearance:    Alert, cooperative, no distress, appears stated age.  Obesity.   Head:    Normocephalic, without obvious abnormality, atraumatic   Eyes:    PERRL, conjunctiva/corneas clear, EOM's intact, fundi     benign, both eyes        Ears:    Normal TM's and external ear canals, both ears   Nose:   Nares normal, septum midline, mucosa normal, no drainage    or sinus tenderness   Throat:   Lips, mucosa, and tongue normal; teeth and gums normal   Neck:   Supple, symmetrical, trachea midline, no adenopathy;        thyroid:  No enlargement/tenderness/nodules; no carotid    bruit or JVD   Back:     Symmetric, no curvature, ROM normal, no CVA tenderness   Lungs:     Clear to auscultation bilaterally, respirations unlabored   Chest wall:    No tenderness or deformity   Heart:    Regular rate and rhythm, S1 and S2 normal, no murmur, rub   or gallop   Abdomen:     Soft, non-tender, bowel sounds active all four quadrants,     no masses, no organomegaly.     Genitalia:    Normal male without lesion, discharge or tenderness.  No inguinal hernia noted.     Rectal:    Normal tone.  Prostate normal/symmetric, no masses or tenderness.   Extremities:   Extremities normal, atraumatic, no cyanosis or edema.  Right lateral epicondyle tenderness.   Pulses:   2+ and symmetric all extremities   Skin:   Skin color, texture, turgor normal, no rashes or lesions   Lymph nodes:   Cervical, supraclavicular, and " axillary nodes normal   Neurologic:   CNII-XII intact. Normal strength, sensation and reflexes       throughout

## 2021-06-11 NOTE — PROGRESS NOTES
Assessment/Plan:     1. Routine general medical examination at a health care facility  Routine healthcare maintenance.  Preventative cares reviewed.  Screening PSA obtained.  Annual physical exams to continue.  - PSA (Prostatic-Specific Antigen), Annual Screen    2. Type 2 diabetes mellitus without complication, without long-term current use of insulin (H)  Type 2 diabetes suboptimal control with A1c increasing slightly from 7.4% up to 7.7% and will continue glipizide metformin 2.5/500 using 2 tablets twice daily with reassessment at follow-up in 4 months.  Patient has gained 10 pounds during pandemic restriction and will decrease weight to less than 230 pounds initially, less than 220 pounds ideally before follow-up.  Microalbumin screen to be repeated.  Diabetic eye exam normal August 10, 2020.  Monofilament testing today was normal.  - Glycosylated Hemoglobin A1c  - Comprehensive Metabolic Panel  - Microalbumin, Random Urine    3. HTN (hypertension)  Hypertension suboptimal control.  Increase losartan from 50 mg up to 100 mg daily if blood pressure recheck at follow-up exam in 4 months, sooner if blood pressures remain above 130/80.  Blood pressure 148/92 on recheck today.  - losartan (COZAAR) 100 MG tablet; TAKE 1 TABLET BY MOUTH EVERY DAY  Dispense: 90 tablet; Refill: 3  - Comprehensive Metabolic Panel    4. Chronic bilateral low back pain without sciatica  Chronic bilateral low back pain.  Has been seen by orthopedic specialist.  Requires diazepam refill for as needed use.  This was provided today.  - diazePAM (VALIUM) 10 MG tablet; Take 0.5-1 tablets (5-10 mg total) by mouth every 8 (eight) hours as needed (muscle spasm).  Dispense: 60 tablet; Refill: 2    5. Hyperlipidemia, unspecified hyperlipidemia type  Hyperlipidemia.  Continues atorvastatin 20 mg daily.  - Comprehensive Metabolic Panel  - Lipid Cascade    6. Chronic pain syndrome  As above, chronic pain syndrome discussed, stable.  Ongoing  bilateral knee pain following prior bilateral knee arthroscopy and meniscectomy procedures.  No longer on Mobic 15 mg daily after 30-day use.    7. Gastroesophageal reflux disease without esophagitis  Omeprazole 20 mg daily without breakthrough symptoms.    8. Abnormal LFTs  Mild LFT elevation likely secondary to hepatic steatosis.  Therapeutic lifestyle changes reviewed as noted above for weight goal less than 220 pounds ideally.  - Comprehensive Metabolic Panel    9. Tubular adenoma of colon  Tubular adenoma on colonoscopy August 2, 2019 and will repeat at 5-year interval.    10. Encounter for immunization  Flublok immunization provided today otherwise immunizations reviewed and up-to-date.  - Influenza, Recombinant, Inj, Quadrivalent, PF, 18+YRS    11. Chronic pain of both knees  Lateral knee pain, chronic status post prior bilateral knee arthroscopy and meniscectomy.    12.  Class I obesity due to excess calories with serious comorbidity and body mass index of 33.0-33.9 in adult  Weight goal remains less than 230 pounds initially, less than 220 pounds ideally.     I have had an Advance Directives discussion with the patient.  The following high BMI interventions were performed this visit: encouragement to exercise, weight monitoring, weight loss from baseline weight and lifestyle education regarding diet.  Ensure ongoing efforts to achieve weight goal < 230 pounds initially, < 220 pounds ideally.              Subjective:      Tyrese HAMILTON Etyasmine is a 55 y.o. male who presents for an annual exam.  In general doing well.  Chronic lower back pain.  Chronic bilateral knee pain.  Has been seen by orthopedic specialist most recently July 22, 2020 with described history of bilateral knee arthroscopy and meniscectomy procedure.  Doing home physical therapy.  Patient does have type 2 diabetes.  Approximately 10 pound weight gains with COVID restrictions.  Continues glipizide metformin 2.5/500 using 2 tablets twice daily.   "Remains on losartan 50 mg daily for hypertension without cough or dizziness.  Uses a wrist cuff at home to check blood pressures infrequently.  Atorvastatin 20 mg daily for lipid management.  Omeprazole 20 mg daily for reflux management.  No longer using Mobic 15 mg daily after 30-day use without benefit described.  Diabetic eye exam August 10, 2020.  Prior microalbumin screen normal August 27, 2019.  Most recent lipid cascade from August 27, 2019 with LDL 78.  Tubular adenoma on colonoscopy August 2, 2019 and will repeat at 5-year interval.  His recent fever cough or shortness of breath.  Comprehensive review of systems as above otherwise all negative.     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 lupus  Sister and nephew with Celiac disease   No smoke   EtOH: [employer] \"1-2/night\" occ 4 on weekend if party, etc.   Surgeries: 3/00 - bilateral Lasik eye surgery; bilateral inguinal hernia repair 5/2014 (Dr. Edward Merino)  12/9/05 - [place] (pneumonia)   Pneumovax 12/05   Tdap 6/25/08  [employer]       Healthy Habits:   Regular Exercise: No  Healthy Diet: No  Dental Visits Regularly: Yes  Seat Belt: Yes   Sexually active: Yes  Colonoscopy: Yes and 8/2/19 (repeat in 5 years due to tubular adenoma)  Lipid Profile: Yes  Glucose Screen: Yes    Immunization History   Administered Date(s) Administered     DT (pediatric) 01/01/1993, 04/02/2004     Hep A, Adult IM (19yr & older) 05/19/2010, 05/04/2011     Hep A, historic 05/19/2010, 05/04/2011     INFLUENZA,RECOMBINANT,INJ,PF QUADRIVALENT 18+YRS 12/14/2018, 12/31/2019     INFLUENZA,SEASONAL QUAD, PF, =/> 6months 12/18/2013, 11/05/2014     Influenza, inj, historic,unspecified 10/01/2015, 10/11/2016     Influenza, seasonal,quad inj 6-35 mos 09/17/2010, 09/26/2012     Influenza,seasonal quad, PF 12/18/2013, 11/05/2014     Influenza,seasonal, Inj IIV3 09/17/2010, " 09/26/2012     Influenza,seasonal,quad inj =/> 6months 10/01/2015, 10/11/2016, 11/21/2017     Pneumo Polysac 23-V 08/15/2014     Td, Adult, Absorbed 01/01/1993, 04/02/2004     Td,adult,historic,unspecified 01/01/1993, 04/02/2004     Tdap 06/25/2008, 09/07/2011     ZOSTER, RECOMBINANT, IM 08/21/2018, 12/14/2018     Immunization status: up to date and documented, needs flu shot.  Vision Screening:both eyes and 8/10/20 without retinopathy  Hearing: PASS     Current Outpatient Medications   Medication Sig Dispense Refill     aspirin 81 MG EC tablet Take 81 mg by mouth daily.       atorvastatin (LIPITOR) 20 MG tablet TAKE 1 TABLET BY MOUTH EVERY DAY 90 tablet 3     desoximetasone (TOPICORT) 0.05 % cream APPLY SPARINGLY TO AFFECTED AREA TWICE A DAY 60 g 5     diazePAM (VALIUM) 10 MG tablet Take 0.5-1 tablets (5-10 mg total) by mouth every 8 (eight) hours as needed (muscle spasm). 60 tablet 2     glipiZIDE-metFORMIN (METAGLIP) 2.5-500 mg per tablet Take 2 tablets by mouth 2 (two) times a day before meals. 360 tablet 3     losartan (COZAAR) 100 MG tablet TAKE 1 TABLET BY MOUTH EVERY DAY 90 tablet 3     omeprazole (PRILOSEC) 20 MG capsule Take 20 mg by mouth daily.       No current facility-administered medications for this visit.      Past Medical History:   Diagnosis Date     Diabetes mellitus (H)      Hypertension      Past Surgical History:   Procedure Laterality Date     HERNIA REPAIR Bilateral May, 2014    Dr. Edward ASCENCIO Bilateral      Lisinopril  Family History   Problem Relation Age of Onset     Celiac disease Mother      Heart disease Father      Social History     Socioeconomic History     Marital status:      Spouse name: Not on file     Number of children: Not on file     Years of education: Not on file     Highest education level: Not on file   Occupational History     Not on file   Social Needs     Financial resource strain: Not on file     Food insecurity     Worry: Not on file     Inability:  Not on file     Transportation needs     Medical: Not on file     Non-medical: Not on file   Tobacco Use     Smoking status: Former Smoker     Smokeless tobacco: Never Used   Substance and Sexual Activity     Alcohol use: Yes     Drug use: No     Sexual activity: Not on file   Lifestyle     Physical activity     Days per week: Not on file     Minutes per session: Not on file     Stress: Not on file   Relationships     Social connections     Talks on phone: Not on file     Gets together: Not on file     Attends [employer] service: Not on file     Active member of club or organization: Not on file     Attends meetings of clubs or organizations: Not on file     Relationship status: Not on file     Intimate partner violence     Fear of current or ex partner: Not on file     Emotionally abused: Not on file     Physically abused: Not on file     Forced sexual activity: Not on file   Other Topics Concern     Not on file   Social History Narrative     Not on file       Review of Systems  Comprehensive ROS: as above, otherwise all negative.           Objective:     /90   Pulse 83   Temp 98.4  F (36.9  C)   Wt (!) 236 lb (107 kg)   SpO2 95%   BMI 33.38 kg/m    Body mass index is 33.38 kg/m .    Physical    General Appearance:    Alert, cooperative, no distress, appears stated age.  BMI = 33.38.   Head:    Normocephalic, without obvious abnormality, atraumatic   Eyes:    PERRL, conjunctiva/corneas clear, EOM's intact, fundi     benign, both eyes        Ears:    Normal TM's and external ear canals, both ears   Nose:   Nares normal, septum midline, mucosa normal, no drainage    or sinus tenderness   Throat:   Lips, mucosa, and tongue normal; teeth and gums normal   Neck:   Supple, symmetrical, trachea midline, no adenopathy;        thyroid:  No enlargement/tenderness/nodules; no carotid    bruit or JVD   Back:     Symmetric, no curvature, ROM normal, no CVA tenderness   Lungs:     Clear to auscultation bilaterally,  respirations unlabored   Chest wall:    No tenderness or deformity   Heart:    Regular rate and rhythm, S1 and S2 normal, no murmur, rub   or gallop   Abdomen:     Soft, non-tender, bowel sounds active all four quadrants,     no masses, no organomegaly.     Genitalia:    Normal male without lesion, discharge or tenderness.  No inguinal hernia noted.     Rectal:    Normal tone.  Prostate normal/symmetric, no masses or tenderness.   Extremities:   Extremities normal, atraumatic, no cyanosis or edema   Pulses:   2+ and symmetric all extremities   Skin:   Skin color, texture, turgor normal, no rashes or lesions   Lymph nodes:   Cervical, supraclavicular, and axillary nodes normal   Neurologic:   CNII-XII intact. Normal strength, sensation and reflexes       throughout.  Monofilament testing normal bilateral feet.                This note has been dictated using voice recognition software and as a result may contain minor grammatical errors and unintended word substitutions.

## 2021-06-12 NOTE — TELEPHONE ENCOUNTER
Refill Approved    Rx renewed per Medication Renewal Policy. Medication was last renewed on 10/18/2019.    Roxanna Hawkins, [facility] Triage/Med Refill 10/5/2020     Requested Prescriptions   Pending Prescriptions Disp Refills     glipiZIDE-metFORMIN (METAGLIP) 2.5-500 mg per tablet [Pharmacy Med Name: GLIPIZIDE-METFORMIN 2.5-500 MG] 360 tablet 3     Sig: TAKE 2 TABLETS BY MOUTH 2 (TWO) TIMES A DAY BEFORE MEALS.       Metformin Refill Protocol Passed - 10/3/2020 12:22 AM        Passed - Blood pressure in last 12 months     BP Readings from Last 1 Encounters:   09/17/20 140/90             Passed - LFT or AST or ALT in last 12 months     Albumin   Date Value Ref Range Status   09/17/2020 4.0 3.5 - 5.0 g/dL Final     Bilirubin, Total   Date Value Ref Range Status   09/17/2020 0.8 0.0 - 1.0 mg/dL Final     Bilirubin, Direct   Date Value Ref Range Status   06/29/2011 0.2 <0.6 mg/dL Final     Alkaline Phosphatase   Date Value Ref Range Status   09/17/2020 90 45 - 120 U/L Final     AST   Date Value Ref Range Status   09/17/2020 47 (H) 0 - 40 U/L Final     ALT   Date Value Ref Range Status   09/17/2020 67 (H) 0 - 45 U/L Final     Protein, Total   Date Value Ref Range Status   09/17/2020 7.0 6.0 - 8.0 g/dL Final                Passed - GFR or Serum Creatinine in last 6 months     GFR MDRD Non Af Amer   Date Value Ref Range Status   09/17/2020 >60 >60 mL/min/1.73m2 Final     GFR MDRD Af Amer   Date Value Ref Range Status   09/17/2020 >60 >60 mL/min/1.73m2 Final             Passed - Visit with PCP or prescribing provider visit in last 6 months or next 3 months     Last office visit with prescriber/PCP: Visit date not found OR same dept: 12/31/2019 Luis Fernando Madrid MD OR same specialty: 12/31/2019 Luis Fernando Madrid MD Last physical: 9/17/2020 Last MTM visit: Visit date not found         Next appt within 3 mo: Visit date not found  Next physical within 3 mo: Visit date not found  Prescriber OR PCP: Luis Fernando Madrid,  MD  Last diagnosis associated with med order: 1. Diabetes mellitus, type 2 (H)  - glipiZIDE-metFORMIN (METAGLIP) 2.5-500 mg per tablet [Pharmacy Med Name: GLIPIZIDE-METFORMIN 2.5-500 MG]; Take 2 tablets by mouth 2 (two) times a day before meals.  Dispense: 360 tablet; Refill: 3     If protocol passes may refill for 12 months if within 3 months of last provider visit (or a total of 15 months).           Passed - A1C in last 6 months     Hemoglobin A1c   Date Value Ref Range Status   09/17/2020 7.7 (H) <=5.6 % Final     Comment:     Normal <5.7% Prediabete 5.7-6.4% Diabletes 6.5% or higher - adopted from ADA consensus guidelines               Passed - Microalbumin in last year      Microalbumin, Random Urine   Date Value Ref Range Status   09/17/2020 1.52 0.00 - 1.99 mg/dL Final

## 2021-06-14 NOTE — PROGRESS NOTES
Assessment/Plan:    1. Type 2 diabetes mellitus without complication, without long-term current use of insulin (H)  Type 2 diabetes suboptimal control with A1c increasing slightly from 7.7% 3.2% today.  We will switch glipizide Metformin 2.5/500 and start the 5/500 and continue 2 tablets twice daily.  Weight goal remains less than 220 pounds initially, less than 210 pounds ideally.  Reassess at physical exam no later than 4 months.  - Glycosylated Hemoglobin A1c  - Comprehensive Metabolic Panel  - glipiZIDE-metFORMIN (METAGLIP) 5-500 mg per tablet; Take 2 tablets by mouth 2 (two) times a day before meals.  Dispense: 360 tablet; Refill: 3    2. Essential hypertension  Hypertension improved control blood pressure 134/88 on recheck x3.  Continues losartan 100 mg daily.  Therapeutic lifestyle changes reviewed as noted above.  - Comprehensive Metabolic Panel    3. Hyperlipidemia, unspecified hyperlipidemia type  Continues atorvastatin 20 mg daily.    4. Abnormal LFTs  Mild LFT elevation likely component of hepatic steatosis as well as statin therapy with prior AST 47 and ALT 67.  Repeat liver function test to ensure stable or improved.  - Comprehensive Metabolic Panel      The following high BMI interventions were performed this visit: encouragement to exercise, weight monitoring, weight loss from baseline weight and lifestyle education regarding diet .  Ensure ongoing efforts to achieve weight goal < 220 pounds initially, < 210 pounds ideally.         Subjective:     Tyrese JOY Ettl is seen today for follow-up assessment.  Had been seen September 17, 2020.  Underlying type 2 diabetes continuing glipizide Metformin 2.5/500 using 2 tablets twice daily.  Had increase losartan previously from 50 mg up to 100 mg daily without any noted side effects or concern.  Not checking blood pressure consistently outside of office.  Atorvastatin 20 mg daily for lipid management.  Mild LFT elevation historically consistent with hepatic  steatosis etiology versus statin therapy.  No abdominal complaints.  Denies recent illness.  Comprehensive review of systems as above otherwise all negative.     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 lupus  Sister and nephew with Celiac disease   No smoke   EtOH: [employer]  Surgeries: 3/00 - bilateral Lasik eye surgery; bilateral inguinal hernia repair 5/2014 (Dr. Edward Merino); bilateral knee arthroscopy and meniscectomy described  12/9/05 - Jermaine's (pneumonia)     Past Surgical History:   Procedure Laterality Date     HERNIA REPAIR Bilateral May, 2014    Dr. Edward ASCENCIO Bilateral         Family History   Problem Relation Age of Onset     Celiac disease Mother      Heart disease Father         Past Medical History:   Diagnosis Date     Diabetes mellitus (H)      Hypertension         Social History     Tobacco Use     Smoking status: Former Smoker     Smokeless tobacco: Never Used   Substance Use Topics     Alcohol use: Yes     Drug use: No        Current Outpatient Medications   Medication Sig Dispense Refill     aspirin 81 MG EC tablet Take 81 mg by mouth daily.       atorvastatin (LIPITOR) 20 MG tablet TAKE 1 TABLET BY MOUTH EVERY DAY 90 tablet 3     desoximetasone (TOPICORT) 0.05 % cream APPLY SPARINGLY TO AFFECTED AREA TWICE A DAY 60 g 5     diazePAM (VALIUM) 10 MG tablet Take 0.5-1 tablets (5-10 mg total) by mouth every 8 (eight) hours as needed (muscle spasm). 60 tablet 2     losartan (COZAAR) 100 MG tablet TAKE 1 TABLET BY MOUTH EVERY DAY 90 tablet 3     omeprazole (PRILOSEC) 20 MG capsule Take 20 mg by mouth daily.       glipiZIDE-metFORMIN (METAGLIP) 5-500 mg per tablet Take 2 tablets by mouth 2 (two) times a day before meals. 360 tablet 3     No current facility-administered medications for this visit.           Objective:    Vitals:    01/19/21 0754 01/19/21 0834   BP: 146/78 134/88    Pulse: (!) 109    Temp: 98.1  F (36.7  C)    SpO2: 93%    Weight: (!) 231 lb (104.8 kg)       Body mass index is 32.68 kg/m .    Alert.  No apparent distress with blood pressure 154/92 rechecked 134/88.  Cardiac exam regular.  Chest clear.      This note has been dictated using voice recognition software and as a result may contain minor grammatical errors and unintended word substitutions.

## 2021-06-14 NOTE — PROGRESS NOTES
Assessment:    1. Type 2 diabetes mellitus  Glycosylated Hemoglobin A1c   2. Non morbid obesity due to excess calories  Comprehensive Metabolic Panel   3. Essential hypertension with goal blood pressure less than 130/80  Comprehensive Metabolic Panel   4. Hyperlipidemia, unspecified hyperlipidemia type  Comprehensive Metabolic Panel    Lipid Cascade   5. Chronic pain syndrome     6. Abnormal LFTs  Comprehensive Metabolic Panel   7. Need for vaccination  Influenza, Seasonal,Quad Inj, 36+ MOS   8. Chronic bilateral low back pain without sciatica  diazePAM (VALIUM) 10 MG tablet         Plan:    A1c slightly improved from 7.5% June 20, 2017 on the 7.2% today.  Continue metformin extended release 500 mg using 2 tablets twice daily.  No GI side effects.  Continues losartan 50 mg daily with blood pressure 124/86 on recheck.  Check lipid cascade today while fasting.  Comprehensive metabolic panel as well for medication monitoring and history of mild LFT elevation question secondary to hepatic steatosis versus statin use.  Continue to monitor.  Flu shot given.  Will need tetanus booster next year.  Recent diabetic eye exam July 27, 2017.  Prior microalbumin screen normal February 14, 2017.  Needs repeat colonoscopy July 2019.  Refill on diazepam 10 mg use half tablet to 1 tablet every 8 hours as needed for muscle spasm.  Has Percocet available for breakthrough pain.  Encourage patient to achieve weight goal less than 210 pounds in order to benefit both diabetes as well as chronic pain management.        Subjective:    Tyrese Pineda is seen today for follow-up evaluation type 2 diabetes.  Continues metformin extended release 500 mg using 2 tablets twice daily.  No GI side effects.  Not checking blood pressures outside the clinic.  Continues losartan 50 mg daily.  Prior microalbumin screen normal February 14, 2017 without history of abnormality.  Uses Percocet and diazepam on as-needed basis for chronic low back pain with mild  "discomfort in the legs, symmetric otherwise no history of significant sciatica or radiculopathy.  Has had mild LFT elevation in the past.  2 pound weight gain since June 20, 2017.  Needs flu shot.  Comprehensive review of systems as above otherwise all negative.  No chest pain or shortness of breath.  No edema.     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 lupus  Sister and nephew with Celiac disease   No smoke   EtOH: [employer] \"1-2/night\" occ 4 on weekend if party, etc.   Surgeries: 3/00 - bilateral Lasik eye surgery; bilateral inguinal hernia repair 5/2014 (Dr. Edward Merino)  12/9/05 - [facility] (pneumonia)   Pneumovax 12/05   Tdap 6/25/08  [employer]     Past Surgical History:   Procedure Laterality Date     HERNIA REPAIR Bilateral May, 2014    Dr. Edward ASCENCIO Bilateral         Family History   Problem Relation Age of Onset     Celiac disease Mother      Heart disease Father         Past Medical History:   Diagnosis Date     Diabetes mellitus      Hypertension         Social History   Substance Use Topics     Smoking status: Former Smoker     Smokeless tobacco: Never Used     Alcohol use Yes        Current Outpatient Prescriptions   Medication Sig Dispense Refill     atorvastatin (LIPITOR) 20 MG tablet TAKE ONE TABLET BY MOUTH ONE TIME DAILY 90 tablet 2     desoximetasone (TOPICORT) 0.05 % cream APPLY SPARINGLY TO AFFECTED AREA TWICE DAILY 60 g 3     diazePAM (VALIUM) 10 MG tablet Take 0.5-1 tablets (5-10 mg total) by mouth every 8 (eight) hours as needed (muscle spasm). 60 tablet 2     losartan (COZAAR) 50 MG tablet TAKE ONE TABLET BY MOUTH ONE TIME DAILY 90 tablet 1     metFORMIN (GLUCOPHAGE-XR) 500 MG 24 hr tablet TAKE 2 TABLETS (1,000 MG TOTAL) BY MOUTH 2 (TWO) TIMES A DAY WITH MEALS. 360 tablet 0     omeprazole (PRILOSEC) 20 MG capsule Take 20 mg by mouth daily.       " oxyCODONE-acetaminophen (PERCOCET) 5-325 mg per tablet Take 1 tablet by mouth every 6 (six) hours as needed for pain (TAKE 1-2 TABLETS EVERY 4-6 HOURS AS NEEDED FOR PAIN). 30 tablet 0     desoximetasone (TOPICORT) 0.05 % cream apply sparingly to affected area twice daily 60 g 10     No current facility-administered medications for this visit.           Objective:    Vitals:    11/21/17 0711 11/21/17 0740   BP: 124/88 124/86   Pulse: 80    Weight: (!) 226 lb (102.5 kg)       Body mass index is 31.52 kg/(m^2).    Alert.  No apparent distress with blood pressure 124/86 on recheck.  Chest clear.  Cardiac exam regular.  Monofilament testing normal today with good peripheral circulation involving dorsalis pedis and posterior tibial pulses.

## 2021-06-15 NOTE — PROGRESS NOTES
Assessment:   1. Sinusitis  - azithromycin (ZITHROMAX Z-SUSANNE) 250 MG tablet; Take 2 tablets (500 mg) on  Day 1,  followed by 1 tablet (250 mg) once daily on Days 2 through 5.  Dispense: 6 tablet; Refill: 0    2. Persistent headaches  Differentials include temporal arteritis and sinusitis.  - C-Reactive Protein(CRP)  - Erythrocyte Sedimentation Rate  - predniSONE (DELTASONE) 20 MG tablet; Take 1 tablet (20 mg total) by mouth daily.  Dispense: 5 tablet; Refill: 0  - CT Head Without Contrast; Future    3. Type 2 diabetes mellitus  - Glycosylated Hemoglobin A1c    4. Fall, initial encounter  - CT Head Without Contrast; Future    Plan:   Continue present treatment and plan.  Prophylactic therapy: NSAIDs due to high frequency of pain.  Asked to keep headache diary.  Patient reassured that neurodiagnostic workup not indicated from benign H&P.  Neurodiagnostic workup: ESR to r/o temporal arteritis and CT to r/o Giant cell arteritis and brain injury.  Follow up at the ED symptom persist or worsens.      Subjective:   Tyrese Pineda is a 52 y.o. male who presents for evaluation of headache. Symptoms began about 4 days ago. Past reports that last Wednesday (7 days ago) he slipped and fell and landed on hid butt, back and finally his head. He denied loosing consciousness but sat their for awhile due to pain but finally got up. His wife tested him for concussion he passed, he also contacted the trauma nurse who tested him for concussion as well. He reports that few days later he went back to the gym and started his normal activities but he was having mild headache but 4 days ago he noticed that the headache had increased in severity and was focused more on his right temporal area. He can also feel pain on his left temporal area and warmth. He rate his headache at a 6 on a scale of 1 to 10. He headache occurs continuously. The headaches do not seem to be related to any time of the day. The headaches are usually sharp.  Recently,  "the headaches have been increasing in severity.  The headaches are usually not preceded by an aura. Associated neurologic symptoms: teary right eye, painful jaw and right outer ear. The patient denies decreased physical activity, depression, dizziness, loss of balance, muscle weakness, numbness of extremities, speech difficulties and vomiting in the early morning. Home treatment has included ibuprofen with some improvement. Other history includes: nothing pertinent. Family history includes no known family members with significant headaches.    The following portions of the patient's history were reviewed and updated as appropriate: allergies, current medications, past family history, past medical history, past social history, past surgical history and problem list.    Review of Systems  A 12 point comprehensive review of systems was negative except as noted.      Objective:   /90  Pulse 68  Temp 98.3  F (36.8  C)  Ht 5' 11\" (1.803 m)  Wt (!) 230 lb 9.6 oz (104.6 kg)  SpO2 95%  BMI 32.16 kg/m2  General appearance: alert, appears stated age and cooperative  Head: Normocephalic, without obvious abnormality, atraumatic, sinuses tender to percussion, right temporal tenderness and warmth with palpation  Eyes: conjunctivae/corneas clear. PERRL, EOM's intact. Fundi benign., watery right eye  Ears: normal TM's and external ear canals both ears  Nose: brown discharge, moderate congestion, turbinates swollen, inflamed  Throat: lips, mucosa, and tongue normal; teeth and gums normal  Neck: no adenopathy, no carotid bruit, no JVD, supple, symmetrical, trachea midline and thyroid not enlarged, symmetric, no tenderness/mass/nodules  Back: symmetric, no curvature. ROM normal. No CVA tenderness.  Lungs: clear to auscultation bilaterally  Heart: regular rate and rhythm, S1, S2 normal, no murmur, click, rub or gallop  Pulses: 2+ and symmetric  Skin: Skin color, texture, turgor normal. No rashes or lesions  Lymph nodes: " Cervical, supraclavicular, and axillary nodes normal.  Neurologic: Grossly normal

## 2021-06-16 PROBLEM — G89.4 CHRONIC PAIN SYNDROME: Status: ACTIVE | Noted: 2018-08-21

## 2021-06-16 PROBLEM — D12.6 TUBULAR ADENOMA OF COLON: Status: ACTIVE | Noted: 2018-08-21

## 2021-06-16 NOTE — TELEPHONE ENCOUNTER
Refill Approved    Rx renewed per Medication Renewal Policy. Medication was last renewed on 04/12/2020.  Last office visit was 01/19/2021 with PCP.    Lilia Hatch, [facility] Triage/Med Refill 3/22/2021     Requested Prescriptions   Pending Prescriptions Disp Refills     atorvastatin (LIPITOR) 20 MG tablet [Pharmacy Med Name: ATORVASTATIN 20 MG TABLET] 90 tablet 3     Sig: TAKE 1 TABLET BY MOUTH EVERY DAY       Statins Refill Protocol (Hmg CoA Reductase Inhibitors) Passed - 3/22/2021 12:31 AM        Passed - PCP or prescribing provider visit in past 12 months      Last office visit with prescriber/PCP: 1/19/2021 Luis Fernando Madrid MD OR same dept: 1/19/2021 Luis Fernando Madrid MD OR same specialty: 1/19/2021 Luis Fernando Madrid MD  Last physical: 9/17/2020 Last MTM visit: Visit date not found   Next visit within 3 mo: Visit date not found  Next physical within 3 mo: Visit date not found  Prescriber OR PCP: Luis Fernando Madrid MD  Last diagnosis associated with med order: 1. Hyperlipidemia  - atorvastatin (LIPITOR) 20 MG tablet [Pharmacy Med Name: ATORVASTATIN 20 MG TABLET]; TAKE 1 TABLET BY MOUTH EVERY DAY  Dispense: 90 tablet; Refill: 3    If protocol passes may refill for 12 months if within 3 months of last provider visit (or a total of 15 months).

## 2021-06-16 NOTE — PROGRESS NOTES
Assessment/Plan:    1. Type 2 diabetes mellitus without complication, without long-term current use of insulin  Type 2 diabetes, stable.  A1c goal remains less than 7% ideally.  Metformin extended release 500 mg using 2 tablets twice daily and tolerating.  Recent A1c of 7.5% January 31, 2018.  Weight goal less than 210 pounds initially, less than 200 pounds ideally.  Reassess at fasting physical July 2018.    2. Essential hypertension with goal blood pressure less than 130/80  Hypertension stable continue losartan 50 mg daily.  Prior microalbumin screen normal February 14, 2017.  Will re-assess at physical exam.    3. Hyperlipidemia, unspecified hyperlipidemia type  Hyperlipidemia continues atorvastatin 20 mg daily.  Patient declines participation in Pemafibrate study currently.  Lipid cascade November 21, 2017 near goal.    4. Chronic pain syndrome  Chronic pain  with chronic lower back pain.  Has Percocet available on as-needed basis that he uses sparingly for breakthrough symptoms only.  Does not require refill.    5. Non morbid obesity due to excess calories  Therapeutic lifestyle changes for weight goal less than 210 pounds initially, less than 200 pounds ideally.          Subjective:    Tyrese Pineda is seen today for follow-up evaluation.  Type 2 diabetes.  Continues metformin extended release 500 mg using 2 capsules twice daily.  Prior A1c having improved from 7.5% down to 7.2% November 21, 2017.  A1c was repeated January 31, 2018 at time of walking care visit.  A1c of 7.5% noted.  Weight loss of 1 pound since November 21, 2017 but not exercising on a consistent basis.  Underlying hypertension continues losartan 50 mg daily while using atorvastatin 20 mg daily for hyperlipidemia.  No breakthrough symptoms with reflux continues omeprazole 20 mg daily.  Percocet for breakthrough pain for lower back pain.  Recent respiratory symptoms over past 2 weeks however slow improvement noted.  No  "fevers or chills.  No nausea vomiting.  History of mild LFT elevation question secondary to statin versus fatty liver versus alcohol use.  Comprehensive review of systems as above otherwise all negative.     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 lupus  Sister and nephew with Celiac disease   No smoke   EtOH: [employer] \"1-2/night\" occ 4 on weekend if party, etc.   Surgeries: 3/00 - bilateral Lasik eye surgery; bilateral inguinal hernia repair 5/2014 (Dr. Edward Merino)  12/9/05 - Jermaine's (pneumonia)   Pneumovax 12/05   Tdap 6/25/08  [employer]     Past Surgical History:   Procedure Laterality Date     HERNIA REPAIR Bilateral May, 2014    Dr. Edward ASCENCIO Bilateral         Family History   Problem Relation Age of Onset     Celiac disease Mother      Heart disease Father         Past Medical History:   Diagnosis Date     Diabetes mellitus      Hypertension         Social History   Substance Use Topics     Smoking status: Former Smoker     Smokeless tobacco: Never Used     Alcohol use Yes        Current Outpatient Prescriptions   Medication Sig Dispense Refill     atorvastatin (LIPITOR) 20 MG tablet TAKE ONE TABLET BY MOUTH ONE TIME DAILY 90 tablet 2     desoximetasone (TOPICORT) 0.05 % cream APPLY SPARINGLY TO AFFECTED AREA TWICE DAILY 60 g 3     diazePAM (VALIUM) 10 MG tablet Take 0.5-1 tablets (5-10 mg total) by mouth every 8 (eight) hours as needed (muscle spasm). 60 tablet 2     losartan (COZAAR) 50 MG tablet TAKE ONE TABLET BY MOUTH ONE TIME DAILY 90 tablet 1     metFORMIN (GLUCOPHAGE-XR) 500 MG 24 hr tablet TAKE 2 TABLETS (1,000 MG TOTAL) BY MOUTH 2 (TWO) TIMES A DAY WITH MEALS. 360 tablet 0     omeprazole (PRILOSEC) 20 MG capsule Take 20 mg by mouth daily.       desoximetasone (TOPICORT) 0.05 % cream apply sparingly to affected area twice daily 60 g 10     oxyCODONE-acetaminophen " (PERCOCET) 5-325 mg per tablet Take 1 tablet by mouth every 6 (six) hours as needed for pain (TAKE 1-2 TABLETS EVERY 4-6 HOURS AS NEEDED FOR PAIN). 30 tablet 0     No current facility-administered medications for this visit.           Objective:    Vitals:    03/20/18 0656   BP: 110/78   Pulse: 88   Weight: (!) 225 lb (102.1 kg)      Body mass index is 31.38 kg/(m^2).    Alert.  No apparent distress.  Chest clear.  No rhonchi or expiratory wheeze.  No inspiratory crackle.  Cardiac exam regular.  Right greater than left nasal pharynx congestion on exam otherwise TMs normal.  No postnasal drainage and oropharyngeal exam.  Neck without cervical lymphadenopathy.  No rash.         [facility]  CT HEAD WO CONTRAST  2/1/2018 3:51 PM     INDICATION: Headache status post trauma and possible temporal arteritis.  TECHNIQUE: Routine. Dose reduction techniques were used.  CONTRAST: None.  COMPARISON:  None.     FINDINGS: No intracranial hemorrhage, extraaxial collection, mass effect or CT evidence of acute infarct. Normal parenchymal density for age.The ventricles and sulci are normal for age. Osseous structures are intact. The visualized orbits, paranasal   sinuses and mastoid air cells are free of significant disease.      IMPRESSION:   CONCLUSION:  1.  No discrete mass lesion, hemorrhage or focal area suggestive of acute edema.   2.  MRI may be more sensitive for evaluation of temporal arteritis.

## 2021-06-17 NOTE — PROGRESS NOTES
Assessment/Plan:    1. Type 2 diabetes mellitus without complication, without long-term current use of insulin (H)  Type 2 diabetes reviewed.  A1c increasing from 8.2% up to 9.6%.  Patient unable to explain significant worsening.  Continues glipizide Metformin 5/500 using 2 tablets twice daily which had been increased from the 2.5/500 variety at prior office visit January 19, 2021.  Addition of Januvia 100 mg daily and reassess at physical exam in 4 months.  - Glycosylated Hemoglobin A1c  - SITagliptin (JANUVIA) 100 MG tablet; Take 1 tablet (100 mg total) by mouth daily. With or without food  Dispense: 30 tablet; Refill: 5  - Comprehensive Metabolic Panel    2. Essential hypertension  Hypertension fair control blood pressure 128/80 on recheck while on losartan 100 mg daily.    3. Hyperlipidemia, unspecified hyperlipidemia type  Continues use of atorvastatin 20 mg daily for lipid management.  Therapeutic lifestyle changes reviewed for weight goal less than 220 pounds initially, less than 210 pounds ideally.    4. Chronic bilateral low back pain without sciatica  Chronic lower back pain does utilize diazepam 10 mg using half tablet to 1 tablet every 8 hours as needed.  - diazePAM (VALIUM) 10 MG tablet; Take 0.5-1 tablets (5-10 mg total) by mouth every 8 (eight) hours as needed (muscle spasm).  Dispense: 60 tablet; Refill: 2    5. Abnormal LFTs  LFT elevation historically noted.  Likely secondary to statin and fatty liver combination.  Recheck today to ensure stable with prior a AST of 77 and ALT 95.  - Comprehensive Metabolic Panel      The following high BMI interventions were performed this visit: encouragement to exercise, weight monitoring, weight loss from baseline weight and lifestyle education regarding diet Ensure ongoing efforts to achieve weight goal < 220 pounds initially, < 210 pounds ideally.         Subjective:    Tyrese HAMILTON Ettl is seen today for follow-up assessment.  Type 2 diabetes.  Had switch from  "glipizide Metformin 2.5/500 up to 5/500 using 2 tablets twice daily due to prior A1c increasing from 7.7% up to 8.2% January 19, 2021.  Continues pretty consistent diet with some indiscretions.  Not getting consistent exercise.  Uses losartan 100 mg daily for hypertension and atorvastatin 20 mg daily for lipid management.  Has had mild LFT elevation historically with AST 77 and ALT 95 at last visit January 19, 2021.  No GI distress issues.  Denies recent illness.  Did have COVID-19 vaccine series.  No current fever cough or shortness of breath.  Comprehensive review of systems as above otherwise all negative.     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 lupus   Sister and nephew with Celiac disease   No smoke   EtOH: [employer] \"1-2/night\" occ 4 on weekend if party, etc.   Surgeries: 3/00 - bilateral Lasik eye surgery; bilateral inguinal hernia repair 5/2014 (Dr. Edward Merino); bilateral knee arthroscopy and meniscectomy described   12/9/05 - [place] (pneumonia)   Pneumovax 12/05   Tdap 6/25/08   [employer]    Past Surgical History:   Procedure Laterality Date     HERNIA REPAIR Bilateral May, 2014    Dr. Edward ASCENCIO Bilateral         Family History   Problem Relation Age of Onset     Celiac disease Mother      Heart disease Father         Past Medical History:   Diagnosis Date     Diabetes mellitus (H)      Hypertension         Social History     Tobacco Use     Smoking status: Former Smoker     Smokeless tobacco: Never Used   Substance Use Topics     Alcohol use: Yes     Drug use: No        Current Outpatient Medications   Medication Sig Dispense Refill     aspirin 81 MG EC tablet Take 81 mg by mouth daily.       atorvastatin (LIPITOR) 20 MG tablet TAKE 1 TABLET BY MOUTH EVERY DAY 90 tablet 3     desoximetasone (TOPICORT) 0.05 % cream APPLY SPARINGLY TO AFFECTED AREA TWICE A DAY 60 " g 5     diazePAM (VALIUM) 10 MG tablet Take 0.5-1 tablets (5-10 mg total) by mouth every 8 (eight) hours as needed (muscle spasm). 60 tablet 2     glipiZIDE-metFORMIN (METAGLIP) 5-500 mg per tablet Take 2 tablets by mouth 2 (two) times a day before meals. 360 tablet 3     losartan (COZAAR) 100 MG tablet TAKE 1 TABLET BY MOUTH EVERY DAY 90 tablet 3     omeprazole (PRILOSEC) 20 MG capsule Take 20 mg by mouth daily.       SITagliptin (JANUVIA) 100 MG tablet Take 1 tablet (100 mg total) by mouth daily. With or without food 30 tablet 5     No current facility-administered medications for this visit.           Objective:    Vitals:    05/19/21 0719 05/19/21 0802   BP: 120/90 128/80   Pulse: 98    SpO2: 95%    Weight: (!) 232 lb (105.2 kg)       Body mass index is 32.82 kg/m .    Alert.  No apparent distress.  Chest clear.  Cardiac exam regular.  Extremities warm and dry.  Blood pressure 128/80 on recheck.      This note has been dictated using voice recognition software and as a result may contain minor grammatical errors and unintended word substitutions.

## 2021-06-20 NOTE — PROGRESS NOTES
Assessment/Plan:     1. Routine general medical examination at a health care facility  Routine healthcare maintenance.  Preventative care is reviewed.  Shingrix immunization provided.  Annual physical exams to continue per  - Varicella Zoster, Recombinant Vaccine IM    2. Class 1 obesity due to excess calories with serious comorbidity and body mass index (BMI) of 31.0 to 31.9 in adult  Dietary and exercise modifications reviewed.  Weight goal less than 220 pounds initially, less than 210 pounds ideally.    3. Diabetes mellitus, type 2 (H)  Type 2 diabetes, suboptimal control with increase A1c from 7.5% up to 8.7%.  Switch from metformin extended release 500 mg using 2 tablets twice daily and began glipizide Metformin 2.5/500 using 2 tablets twice daily and reassess in next 3-4 months in office.  Dietary and exercise modifications reviewed.  Check screening microalbumin.  Ensure stable renal function and glycemic control.  - Glycosylated Hemoglobin A1c  - glipiZIDE-metFORMIN (METAGLIP) 2.5-500 mg per tablet; Take 2 tablets by mouth 2 (two) times a day before meals.  Dispense: 360 tablet; Refill: 1  - Comprehensive Metabolic Panel  - Microalbumin, Random Urine; Future    4. Essential hypertension  Hypertension, stable.  Continues losartan 50 mg daily.  - Comprehensive Metabolic Panel    5. Hyperlipidemia, unspecified hyperlipidemia type  Continues atorvastatin 20 mg daily.  Check lipid cascade.  - lipid cascade    6. Abnormal LFTs  History of abnormal LFTs question fatty liver vs. statin use  - Comprehensive Metabolic Panel    7. Chronic pain syndrome  Use of Percocet and Valium on as-needed basis for lower back pain concerns.  Continues to use sparingly per    8. Gastroesophageal reflux disease without esophagitis  Continues omeprazole 20 mg daily.  No breakthrough symptoms or reflux.    9. Tubular adenoma of colon  Prior colonoscopy July 24, 2014 and told repeat 5 year interval with history of tubular  "adenoma.    10. Snoring  Referral placed to help the sleep medicine secondary to snoring concerns rule out obstructive sleep apnea.  Dietary and exercise modifications reviewed as noted above for weight goal less than 210 pounds ideally.  - Ambulatory referral to Sleep Medicine     I have had an Advance Directives discussion with the patient.  The following high BMI interventions were performed this visit: encouragement to exercise, weight monitoring, weight loss from baseline weight and lifestyle education regarding diet        Subjective:      Tyrese Pineda is a 53 y.o. male who presents for an annual exam.  Doing well.  Does snore.  Some daytime somnolence.  Disrupted sleep.  Dietary indiscretions.  Not exercising consistently.  Uses Metformin extended release 500 mg using 2 tablets twice daily.  No prior concerns for diarrhea.  If anything slightly constipated.  Omeprazole 20 mg daily for reflux without breakthrough symptoms.  Losartan 50 mg daily for hypertension.  Atorvastatin 20 mg for lipid management.  Has had mild LFT elevation historically hepatitis screen negative May 17, 2011.  History of tubular adenoma prior screening colonoscopy July 24, 2014 told her repeat 5 year interval.  Comprehensive review of systems as above otherwise all negative.     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 lupus  Sister and nephew with Celiac disease   No smoke   EtOH: [employer] \"1-2/night\" occ 4 on weekend if party, etc.   Surgeries: 3/00 - bilateral Lasik eye surgery; bilateral inguinal hernia repair 5/2014 (Dr. Edward Merino)  12/9/05 - [place] (pneumonia)   Pneumovax 12/05   Tdap 6/25/08  [employer]     Healthy Habits:   Regular Exercise: No  Healthy Diet: No  Dental Visits Regularly: Yes  Seat Belt: Yes   Sexually active: Yes  Colonoscopy: Yes and 7/24/14 (tubular adenoma - repeat in 5 years)  Lipid " Profile: Yes  Glucose Screen: Yes    Immunization History   Administered Date(s) Administered     DT (pediatric) 01/01/1993, 04/02/2004     Hep A, Adult IM (19yr & older) 05/19/2010, 05/04/2011     Hep A, historic 05/19/2010, 05/04/2011     Influenza, inj, historic,unspecified 10/01/2015, 10/11/2016     Influenza, seasonal,quad inj 36+ mos 10/01/2015, 10/11/2016, 11/21/2017     Influenza, seasonal,quad inj 6-35 mos 09/17/2010, 09/26/2012     Influenza,seasonal quad, PF 12/18/2013, 11/05/2014     Influenza,seasonal quad, PF, 36+MOS 12/18/2013, 11/05/2014     Influenza,seasonal, Inj IIV3 09/17/2010, 09/26/2012     Pneumo Polysac 23-V 08/15/2014     Td, Adult, Absorbed 01/01/1993, 04/02/2004     Td,adult,historic,unspecified 01/01/1993, 04/02/2004     Tdap 06/25/2008, 09/07/2011     Immunization status: up to date and documented.  Vision Screening:both eyes and reading glasses  Hearing: PASS     Current Outpatient Prescriptions   Medication Sig Dispense Refill     aspirin 81 MG EC tablet Take 81 mg by mouth daily.       atorvastatin (LIPITOR) 20 MG tablet Take 1 tablet (20 mg total) by mouth daily. 90 tablet 1     desoximetasone (TOPICORT) 0.05 % cream APPLY SPARINGLY TO AFFECTED AREA(S) TWICE DAILY 60 g 5     diazePAM (VALIUM) 10 MG tablet Take 0.5-1 tablets (5-10 mg total) by mouth every 8 (eight) hours as needed (muscle spasm). 60 tablet 2     losartan (COZAAR) 50 MG tablet TAKE ONE TABLET BY MOUTH ONE TIME DAILY 90 tablet 1     omeprazole (PRILOSEC) 20 MG capsule Take 20 mg by mouth daily.       oxyCODONE-acetaminophen (PERCOCET) 5-325 mg per tablet Take 1 tablet by mouth every 6 (six) hours as needed for pain (TAKE 1-2 TABLETS EVERY 4-6 HOURS AS NEEDED FOR PAIN). 30 tablet 0     glipiZIDE-metFORMIN (METAGLIP) 2.5-500 mg per tablet Take 2 tablets by mouth 2 (two) times a day before meals. 360 tablet 1     No current facility-administered medications for this visit.      Past Medical History:   Diagnosis Date      "Diabetes mellitus (H)      Hypertension      Past Surgical History:   Procedure Laterality Date     HERNIA REPAIR Bilateral May, 2014    Dr. Edward ASCENCIO Bilateral      Lisinopril  Family History   Problem Relation Age of Onset     Celiac disease Mother      Heart disease Father      Social History     Social History     Marital status:      Spouse name: N/A     Number of children: N/A     Years of education: N/A     Occupational History     Not on file.     Social History Main Topics     Smoking status: Former Smoker     Smokeless tobacco: Never Used     Alcohol use Yes     Drug use: No     Sexual activity: Not on file     Other Topics Concern     Not on file     Social History Narrative       Review of Systems  Comprehensive ROS: as above, otherwise all negative.           Objective:     /80  Pulse 94  Ht 5' 11\" (1.803 m)  Wt (!) 225 lb (102.1 kg)  SpO2 94%  BMI 31.38 kg/m2  Body mass index is 31.38 kg/(m^2).    Physical    General Appearance:    Alert, cooperative, no distress, appears stated age.  Obesity.   Head:    Normocephalic, without obvious abnormality, atraumatic   Eyes:    PERRL, conjunctiva/corneas clear, EOM's intact, fundi     benign, both eyes        Ears:    Normal TM's and external ear canals, both ears   Nose:   Nares normal, septum midline, mucosa normal, no drainage    or sinus tenderness   Throat:   Lips, mucosa, and tongue normal; teeth and gums normal   Neck:   Supple, symmetrical, trachea midline, no adenopathy;        thyroid:  No enlargement/tenderness/nodules; no carotid    bruit or JVD   Back:     Symmetric, no curvature, ROM normal, no CVA tenderness   Lungs:     Clear to auscultation bilaterally, respirations unlabored   Chest wall:    No tenderness or deformity   Heart:    Regular rate and rhythm, S1 and S2 normal, no murmur, rub   or gallop   Abdomen:     Soft, non-tender, bowel sounds active all four quadrants,     no masses, no organomegaly.   "   Genitalia:    Normal male without lesion, discharge or tenderness.  No inguinal hernia noted.     Rectal:    Normal tone.  Prostate normal/symmetric, no masses or tenderness.   Extremities:   Extremities normal, atraumatic, no cyanosis or edema   Pulses:   2+ and symmetric all extremities   Skin:   Skin color, texture, turgor normal, no rashes or lesions   Lymph nodes:   Cervical, supraclavicular, and axillary nodes normal   Neurologic:   CNII-XII intact. Normal strength, sensation and reflexes       throughout                This note has been dictated using voice recognition software and as a result may contain minor grammatical errors and unintended word substitutions.

## 2021-06-20 NOTE — LETTER
Letter by Mounika Egan LPN at      Author: Mounika Egan LPN Service: -- Author Type: --    Filed:  Encounter Date: 3/4/2020 Status: (Other)         Tyrese HAMILTON Edwin  [address] [place] [address]             March 4, 2020         Dear Mr. Pineda,     If you decide to follow through with your in lab sleep study, please call [phone] and I will help you  schedule that appointment.  Thank you for letting [facility] participate in your  care.    Please call with questions or contact us using [employer].    Sincerely,        Electronically signed by Mounika Egan LPN

## 2021-06-21 NOTE — PROGRESS NOTES
Dear  Luis Fernando Madrid Md  [address]  [address]  [place] [address]    Thank you for the opportunity to participate in the care of  Tyrese Pineda.    He is a 53 y.o. y/o who comes to the clinic for evaluation of snoring.    The patient comes to clinic with his wife and she provided some of the information for this visit. The patient himself was not completely sure about the reason for today's visit and we devoted some time to talk about the information that we received from the referral.     The patient has been told that he snores at night. The snoring is very loud. He has been told that he stops breathing at night.     He mentions that he drinks alcohol 1 to 2 drinks every night due to high stress at work. He does not think that his drinking habits are a problem.     ESS: 3  STOP BAN/8    Past Medical History  Past Medical History:   Diagnosis Date     Diabetes mellitus (H)      Hypertension         Past Surgical History  Past Surgical History:   Procedure Laterality Date     HERNIA REPAIR Bilateral May, 2014    Dr. Edward Merino     LASIK Bilateral         Meds  Current Outpatient Prescriptions   Medication Sig Dispense Refill     aspirin 81 MG EC tablet Take 81 mg by mouth daily.       atorvastatin (LIPITOR) 20 MG tablet Take 1 tablet (20 mg total) by mouth daily. 90 tablet 1     desoximetasone (TOPICORT) 0.05 % cream APPLY SPARINGLY TO AFFECTED AREA(S) TWICE DAILY 60 g 5     diazePAM (VALIUM) 10 MG tablet Take 0.5-1 tablets (5-10 mg total) by mouth every 8 (eight) hours as needed (muscle spasm). 60 tablet 2     glipiZIDE-metFORMIN (METAGLIP) 2.5-500 mg per tablet Take 2 tablets by mouth 2 (two) times a day before meals. 360 tablet 1     losartan (COZAAR) 50 MG tablet TAKE ONE TABLET BY MOUTH ONE TIME DAILY 90 tablet 3     omeprazole (PRILOSEC) 20 MG capsule Take 20 mg by mouth daily.       oxyCODONE-acetaminophen (PERCOCET) 5-325 mg per tablet Take 1 tablet by mouth every 6 (six) hours as needed for pain  "(TAKE 1-2 TABLETS EVERY 4-6 HOURS AS NEEDED FOR PAIN). 30 tablet 0     No current facility-administered medications for this visit.         Allergies  Lisinopril     Social History  Social History     Social History     Marital status:      Spouse name: N/A     Number of children: N/A     Years of education: N/A     Occupational History     Not on file.     Social History Main Topics     Smoking status: Former Smoker     Smokeless tobacco: Never Used     Alcohol use Yes     Drug use: No     Sexual activity: Not on file     Other Topics Concern     Not on file     Social History Narrative        Family History  Family History   Problem Relation Age of Onset     Celiac disease Mother      Heart disease Father            Review of Systems:  Constitutional: Negative except as noted in HPI.   Eyes: Negative except as noted in HPI.   ENT: Negative except as noted in HPI.   Cardiovascular: Negative except as noted in HPI.   Respiratory: Negative except as noted in HPI.   Gastrointestinal: Negative except as noted in HPI.   Genitourinary: Negative except as noted in HPI.   Musculoskeletal: Negative except as noted in HPI.   Integumentary: Negative except as noted in HPI.   Neurological: Negative except as noted in HPI.   Psychiatric: Negative except as noted in HPI.   Endocrine: Negative except as noted in HPI.   Hematologic/Lymphatic: Negative except as noted in HPI.      Physical Exam:  Pulse 97  Ht 5' 11\" (1.803 m)  Wt (!) 226 lb 9.6 oz (102.8 kg)  SpO2 93%  BMI 31.6 kg/m2  BMI:Body mass index is 31.6 kg/(m^2).   GEN: NAD, obese  Head: Normocephalic.  EYES: PERRLA, EOMI  ENT: Oropharynx is clear, Mallampatti class IV airway. Uvula is enlarged and edematous  Nasal mucosa is red and edematous  Neurological: Alert, oriented to time, place, and person.  Psych:  normal mood, normal affect     Labs/Studies:     Lab Results   Component Value Date    WBC 4.9 02/23/2016    HGB 16.8 02/23/2016    HCT 49.2 02/23/2016    " MCV 90 02/23/2016     02/23/2016         Chemistry        Component Value Date/Time     08/21/2018 0742    K 4.3 08/21/2018 0742     08/21/2018 0742    CO2 24 08/21/2018 0742    BUN 12 08/21/2018 0742    CREATININE 0.97 08/21/2018 0742     (H) 08/21/2018 0742        Component Value Date/Time    CALCIUM 10.2 08/21/2018 0742    ALKPHOS 95 08/21/2018 0742    AST 34 08/21/2018 0742    ALT 51 (H) 08/21/2018 0742    BILITOT 1.1 (H) 08/21/2018 0742            No results found for: FERRITIN  Lab Results   Component Value Date    TSH 1.6 04/07/2010     Lab Results   Component Value Date    HGBA1C 8.7 (H) 08/21/2018             Assessment and Plan:  In summary Tyrese Pineda is a 53 y.o. year old male here for consultation.    1. Snoring/ witnessed apneas/ risk for sleep apnea.  We had a nisha conversation about the potential role that alcohol could be playing on his snoring.  I think that he would benefit from having a week or two at home without any alcohol and see if this solves his snoring.  He does not think that stopping alcohol will be a problem but I did advise him to contact his primary care physician or a health care facility if this was a problem since alcohol withdrawal could be be a serious problem.  He has high risk for obstructive sleep apnea based on the results of his STOP BANG questionnaire and crowded airway.  Recommend getting an overnight polysomnography but the patient would like to think about our conversation and he will call us when he is ready to make a decision.  The patient should return to the clinic to discuss results and treatment option in a patient-centered approach.     Patient verbalized understanding of these issues, agrees with the plan and all questions were answered today. Patient was given an opportuntity to voice any other symptoms or concerns not listed above. Patient did not have any other symptoms or concerns.      Jac Dye MD  [facility] Board Certified in  Internal Medicine and Sleep Medicine  [facility].

## 2021-06-22 NOTE — PROGRESS NOTES
Assessment/Plan:    1. Diabetes mellitus, type 2 (H)  Type 2 diabetes with improved control A1c decreasing from 8.7% down to 6.9% after switching from metformin to a combination glipizide metformin.  Tolerating well.  No side effects or hypoglycemia identified.  Recent diabetic eye exam August 2018 with scheduled eye exam August 5, 2019 noted.  Micro albumin screen and monofilament testing normal August 21, 2018.  Diabetes follow-up in 4 months anticipated.  Therapeutic lifestyle changes for weight goal less than 220 pounds initially, less than 210 pounds ideally.  - Glycosylated Hemoglobin A1c  - Comprehensive Metabolic Panel    2. Essential hypertension  Hypertension, fair control blood pressure 134/84 on losartan 50 mg daily.  Ensure stable renal function and electrolytes.  - Comprehensive Metabolic Panel    3. Hyperlipidemia, unspecified hyperlipidemia type  History of hyperlipidemia.  Continues atorvastatin 20 mg daily.  Cholesterol results August 21, 2018 near goal.  Therapeutic lifestyle changes reviewed as noted above for weight goal less than 220 pounds initially, less than 210 pounds ideally.  - Comprehensive Metabolic Panel    4. Snoring  History of snoring with question apneic episodes.  Seen by Dr. Bustamante October 15, 2018.  Patient elected to defer sleep study and purchase Smart sleep number mattress.  Continued benefits with weight loss and restriction of alcohol reviewed.    5. Tubular adenoma of colon  History of tubular adenomas on colonoscopy.  Will repeat July 2019 at 5-year interval.    6. Need for vaccination  Shingrix booster provided.  Flu shot given today.  - Influenza, Recombinant, Inj, Quadrivalent, PF, 18+YRS  - Varicella Zoster, Recombinant Vaccine IM    7. Chronic bilateral low back pain without sciatica  Refill on diazepam 10 mg typically half tablet at bedtime due to chronic low back pain with muscle spasm.  - diazePAM (VALIUM) 10 MG tablet; Take 0.5-1 tablets (5-10 mg total) by mouth  every 8 (eight) hours as needed (muscle spasm).  Dispense: 60 tablet; Refill: 2      The following high BMI interventions were performed this visit: encouragement to exercise, weight monitoring, weight loss from baseline weight and lifestyle education regarding diet.  Ensure ongoing efforts to achieve weight goal < 220 pounds initially, < 210 pounds ideally.         Subjective:    Tyrese HAMILTON Etyasmine is seen today for follow-up evaluation.  Type 2 diabetes.  Seen for physical August 21, 2018.  A1c of 8.7%.  Switch from metformin extended release 2000 mg daily to glipizide metformin 2.5/500 using 2 tablets twice daily.  No hypoglycemic episodes.  Tolerating well.  No diarrhea.  Continues home medications as noted.  Needs refill on diazepam that he uses 5 mg dose at bedtime typically to help with back discomfort worse in the morning with muscle spasm described.  Snoring history.  Was referred for sleep study as noted below.  Patient has elected to defer sleep study and did purchase new mattress which he feels has been beneficial.  No recent illness.  Needs flu shot.  No breakthrough symptoms with reflux if using omeprazole 20 mg daily.  Comprehensive review of systems as above otherwise all negative.      Reviewed office visit with Dr. Dye from 10/15/18:  Snoring/ witnessed apneas/ risk for sleep apnea.  We had a nisha conversation about the potential role that alcohol could be playing on his snoring.  I think that he would benefit from having a week or two at home without any alcohol and see if this solves his snoring.  He does not think that stopping alcohol will be a problem but I did advise him to contact his primary care physician or a health care facility if this was a problem since alcohol withdrawal could be be a serious problem.  He has high risk for obstructive sleep apnea based on the results of his STOP BANG questionnaire and crowded airway.  Recommend getting an overnight polysomnography but the patient would  "like to think about our conversation and he will call us when he is ready to make a decision.  The patient should return to the clinic to discuss results and treatment option in a patient-centered approach.         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 lupus  Sister and nephew with Celiac disease   No smoke   EtOH: [employer] \"1-2/night\" occ 4 on weekend if party, etc.   Surgeries: 3/00 - bilateral Lasik eye surgery; bilateral inguinal hernia repair 5/2014 (Dr. Edward Merino)  12/9/05 - [place] (pneumonia)   Pneumovax 12/05   Tdap 6/25/08  [employer]     Past Surgical History:   Procedure Laterality Date     HERNIA REPAIR Bilateral May, 2014    Dr. Edward ASCENCIO Bilateral         Family History   Problem Relation Age of Onset     Celiac disease Mother      Heart disease Father         Past Medical History:   Diagnosis Date     Diabetes mellitus (H)      Hypertension         Social History     Tobacco Use     Smoking status: Former Smoker     Smokeless tobacco: Never Used   Substance Use Topics     Alcohol use: Yes     Drug use: No        Current Outpatient Medications   Medication Sig Dispense Refill     aspirin 81 MG EC tablet Take 81 mg by mouth daily.       atorvastatin (LIPITOR) 20 MG tablet TAKE 1 TABLET BY MOUTH ONCE DAILY 90 tablet 1     desoximetasone (TOPICORT) 0.05 % cream APPLY SPARINGLY TO AFFECTED AREA(S) TWICE DAILY 60 g 5     diazePAM (VALIUM) 10 MG tablet Take 0.5-1 tablets (5-10 mg total) by mouth every 8 (eight) hours as needed (muscle spasm). 60 tablet 2     glipiZIDE-metFORMIN (METAGLIP) 2.5-500 mg per tablet TAKE 2 TABLETS BY MOUTH TWO TIMES A DAY BEFORE MEALS. 360 tablet 1     losartan (COZAAR) 50 MG tablet TAKE ONE TABLET BY MOUTH ONE TIME DAILY 90 tablet 3     omeprazole (PRILOSEC) 20 MG capsule Take 20 mg by mouth daily.       oxyCODONE-acetaminophen (PERCOCET) 5-325 " mg per tablet Take 1 tablet by mouth every 6 (six) hours as needed for pain (TAKE 1-2 TABLETS EVERY 4-6 HOURS AS NEEDED FOR PAIN). 30 tablet 0     No current facility-administered medications for this visit.           Objective:    Vitals:    12/14/18 0649 12/14/18 0730   BP: 126/88 134/84   Pulse: 85    SpO2: 97%    Weight: (!) 225 lb (102.1 kg)       Body mass index is 31.38 kg/m .    Alert.  No apparent distress.  Blood pressure 134/84 on recheck otherwise chest clear.  Cardiac exam regular.      This note has been dictated using voice recognition software and as a result may contain minor grammatical errors and unintended word substitutions.

## 2021-06-23 NOTE — TELEPHONE ENCOUNTER
Refill Approved    Rx renewed per Medication Renewal Policy. Medication was last renewed on 9/25/18    Jovani Christie, [facility] [place] Connection Triage/Med Refill 1/27/2019     Requested Prescriptions   Pending Prescriptions Disp Refills     losartan (COZAAR) 50 MG tablet [Pharmacy Med Name: LOSARTAN POTASSIUM 50 MG TAB] 90 tablet 1     Sig: TAKE ONE TABLET BY MOUTH ONE TIME DAILY    Angiotensin Receptor Blocker Protocol Passed - 1/25/2019 11:40 AM       Passed - PCP or prescribing provider visit in past 12 months      Last office visit with prescriber/PCP: 12/14/2018 Luis Fernando Madrid MD OR same dept: 12/14/2018 Luis Fernando Madrid MD OR same specialty: 12/14/2018 Luis Fernando Madrid MD  Last physical: 8/21/2018 Last MTM visit: Visit date not found   Next visit within 3 mo: Visit date not found  Next physical within 3 mo: Visit date not found  Prescriber OR PCP: Luis Fernando Madrid MD  Last diagnosis associated with med order: 1. HTN (hypertension)  - losartan (COZAAR) 50 MG tablet [Pharmacy Med Name: LOSARTAN POTASSIUM 50 MG TAB]; TAKE ONE TABLET BY MOUTH ONE TIME DAILY  Dispense: 90 tablet; Refill: 1    If protocol passes may refill for 12 months if within 3 months of last provider visit (or a total of 15 months).            Passed - Serum potassium within the past 12 months    Lab Results   Component Value Date    Potassium 5.1 (H) 12/14/2018            Passed - Blood pressure filed in past 12 months    BP Readings from Last 1 Encounters:   12/14/18 134/84            Passed - Serum creatinine within the past 12 months    Creatinine   Date Value Ref Range Status   12/14/2018 0.92 0.70 - 1.30 mg/dL Final

## 2021-06-23 NOTE — TELEPHONE ENCOUNTER
Patient reports pharmacy does not have refills that were sent in September.  Please review/re-send if appropriate.

## 2021-06-25 NOTE — TELEPHONE ENCOUNTER
RN cannot approve Refill Request    RN can NOT refill this medication med is not covered by policy/route to provider. Last office visit: 5/19/2021 Luis Fernando Madrid MD Last Physical: 9/17/2020 Last MTM visit: Visit date not found Last visit same specialty: 5/19/2021 Luis Fernando Madrid MD.  Next visit within 3 mo: Visit date not found  Next physical within 3 mo: Visit date not found      Pam Serrano, [facility] Connection Triage/Med Refill 6/7/2021    Requested Prescriptions   Pending Prescriptions Disp Refills     desoximetasone (TOPICORT) 0.05 % cream [Pharmacy Med Name: DESOXIMETASONE 0.05% CREAM] 60 g 5     Sig: APPLY SPARINGLY TO AFFECTED AREA TWICE A DAY       There is no refill protocol information for this order

## 2021-08-16 ENCOUNTER — TRANSFERRED RECORDS (OUTPATIENT)
Dept: HEALTH INFORMATION MANAGEMENT | Facility: CLINIC | Age: 56
End: 2021-08-16

## 2021-08-16 LAB — RETINOPATHY: NEGATIVE

## 2021-09-11 ENCOUNTER — HEALTH MAINTENANCE LETTER (OUTPATIENT)
Age: 56
End: 2021-09-11

## 2021-09-21 ENCOUNTER — OFFICE VISIT (OUTPATIENT)
Dept: FAMILY MEDICINE | Facility: CLINIC | Age: 56
End: 2021-09-21
Payer: COMMERCIAL

## 2021-09-21 VITALS
HEART RATE: 85 BPM | WEIGHT: 230 LBS | DIASTOLIC BLOOD PRESSURE: 80 MMHG | OXYGEN SATURATION: 94 % | BODY MASS INDEX: 32.2 KG/M2 | HEIGHT: 71 IN | SYSTOLIC BLOOD PRESSURE: 120 MMHG

## 2021-09-21 DIAGNOSIS — K21.9 GASTROESOPHAGEAL REFLUX DISEASE WITHOUT ESOPHAGITIS: ICD-10-CM

## 2021-09-21 DIAGNOSIS — E66.09 CLASS 1 OBESITY DUE TO EXCESS CALORIES WITH SERIOUS COMORBIDITY AND BODY MASS INDEX (BMI) OF 32.0 TO 32.9 IN ADULT: ICD-10-CM

## 2021-09-21 DIAGNOSIS — E78.5 HYPERLIPIDEMIA, UNSPECIFIED HYPERLIPIDEMIA TYPE: ICD-10-CM

## 2021-09-21 DIAGNOSIS — E66.811 CLASS 1 OBESITY DUE TO EXCESS CALORIES WITH SERIOUS COMORBIDITY AND BODY MASS INDEX (BMI) OF 32.0 TO 32.9 IN ADULT: ICD-10-CM

## 2021-09-21 DIAGNOSIS — G89.29 CHRONIC BILATERAL LOW BACK PAIN WITHOUT SCIATICA: ICD-10-CM

## 2021-09-21 DIAGNOSIS — R79.89 ABNORMAL LFTS: ICD-10-CM

## 2021-09-21 DIAGNOSIS — Z00.00 ROUTINE PHYSICAL EXAMINATION: Primary | ICD-10-CM

## 2021-09-21 DIAGNOSIS — I10 ESSENTIAL HYPERTENSION: ICD-10-CM

## 2021-09-21 DIAGNOSIS — E11.9 TYPE 2 DIABETES MELLITUS WITHOUT COMPLICATION, WITHOUT LONG-TERM CURRENT USE OF INSULIN (H): ICD-10-CM

## 2021-09-21 DIAGNOSIS — G47.33 OSA ON CPAP: ICD-10-CM

## 2021-09-21 DIAGNOSIS — M54.50 CHRONIC BILATERAL LOW BACK PAIN WITHOUT SCIATICA: ICD-10-CM

## 2021-09-21 LAB
ALBUMIN SERPL-MCNC: 4.3 G/DL (ref 3.5–5)
ALP SERPL-CCNC: 90 U/L (ref 45–120)
ALT SERPL W P-5'-P-CCNC: 56 U/L (ref 0–45)
ANION GAP SERPL CALCULATED.3IONS-SCNC: 15 MMOL/L (ref 5–18)
AST SERPL W P-5'-P-CCNC: 35 U/L (ref 0–40)
BILIRUB SERPL-MCNC: 0.5 MG/DL (ref 0–1)
BUN SERPL-MCNC: 12 MG/DL (ref 8–22)
CALCIUM SERPL-MCNC: 10 MG/DL (ref 8.5–10.5)
CHLORIDE BLD-SCNC: 102 MMOL/L (ref 98–107)
CHOLEST SERPL-MCNC: 153 MG/DL
CO2 SERPL-SCNC: 24 MMOL/L (ref 22–31)
CREAT SERPL-MCNC: 0.93 MG/DL (ref 0.7–1.3)
CREAT UR-MCNC: 315 MG/DL
FASTING STATUS PATIENT QL REPORTED: ABNORMAL
GFR SERPL CREATININE-BSD FRML MDRD: >90 ML/MIN/1.73M2
GLUCOSE BLD-MCNC: 65 MG/DL (ref 70–125)
HBA1C MFR BLD: 7.2 % (ref 0–5.6)
HDLC SERPL-MCNC: 43 MG/DL
LDLC SERPL CALC-MCNC: 66 MG/DL
MICROALBUMIN UR-MCNC: 1.63 MG/DL (ref 0–1.99)
MICROALBUMIN/CREAT UR: 5.2 MG/G CR
POTASSIUM BLD-SCNC: 3.9 MMOL/L (ref 3.5–5)
PROT SERPL-MCNC: 7.9 G/DL (ref 6–8)
PSA SERPL-MCNC: 1.21 UG/L (ref 0–3.5)
SODIUM SERPL-SCNC: 141 MMOL/L (ref 136–145)
TRIGL SERPL-MCNC: 222 MG/DL

## 2021-09-21 PROCEDURE — 90471 IMMUNIZATION ADMIN: CPT | Performed by: FAMILY MEDICINE

## 2021-09-21 PROCEDURE — 90682 RIV4 VACC RECOMBINANT DNA IM: CPT | Performed by: FAMILY MEDICINE

## 2021-09-21 PROCEDURE — G0103 PSA SCREENING: HCPCS | Performed by: FAMILY MEDICINE

## 2021-09-21 PROCEDURE — 80061 LIPID PANEL: CPT | Performed by: FAMILY MEDICINE

## 2021-09-21 PROCEDURE — 80053 COMPREHEN METABOLIC PANEL: CPT | Performed by: FAMILY MEDICINE

## 2021-09-21 PROCEDURE — 83036 HEMOGLOBIN GLYCOSYLATED A1C: CPT | Performed by: FAMILY MEDICINE

## 2021-09-21 PROCEDURE — 99396 PREV VISIT EST AGE 40-64: CPT | Mod: 25 | Performed by: FAMILY MEDICINE

## 2021-09-21 PROCEDURE — 36415 COLL VENOUS BLD VENIPUNCTURE: CPT | Performed by: FAMILY MEDICINE

## 2021-09-21 PROCEDURE — 82043 UR ALBUMIN QUANTITATIVE: CPT | Performed by: FAMILY MEDICINE

## 2021-09-21 ASSESSMENT — MIFFLIN-ST. JEOR: SCORE: 1890.4

## 2021-09-21 NOTE — PROGRESS NOTES
Assessment/Plan:     Routine physical examination  Routine healthcare maintenance.  Preventative cares reviewed.  Flublok immunization provided otherwise immunizations up-to-date.  PSA screen completed today based on age criteria.  Annual physical exams to continue.  - Prostate Specific Antigen Screen  - INFLUENZA QUAD, PF (RIV4) (FLUBLOK)  - Prostate Specific Antigen Screen    Type 2 diabetes mellitus without complication, without long-term current use of insulin (H)  Type 2 diabetes reviewed.  Improvement noted with A1c previously increasing from 8.2% up to 9.6% May 19, 2021.  Addition at that time a Januvia 100 mg daily while continuing glipizide Metformin 5/500 using 2 tablets twice daily.  A1c today improved to 7.2%.  Recent diabetic eye exam August 16, 2021 without retinopathy.  Monofilament testing was normal.  Will update microalbumin screen with prior testing normal September 17, 2020.  Continues aspirin 81 mg daily.  Diabetes recheck in 4 months anticipated in this office.  - Hemoglobin A1c  - Albumin Random Urine Quantitative with Creat Ratio  - Comprehensive metabolic panel  - Comprehensive metabolic panel    Essential hypertension  Continues use of losartan 100 mg daily.  Med monitoring completed to ensure stable renal function and electrolytes.  - Comprehensive metabolic panel  - Comprehensive metabolic panel    Hyperlipidemia, unspecified hyperlipidemia type  Remains on atorvastatin 20 mg daily.  Update lipid cascade today while fasting.  - Lipid panel reflex to direct LDL Fasting  - Lipid panel reflex to direct LDL Fasting    Abnormal LFTs  LFT elevation historically with likely component of statin use as well as hepatic steatosis.  - Comprehensive metabolic panel  - Comprehensive metabolic panel    Chronic bilateral low back pain without sciatica  Chronic bilateral lower back pain.  Has diazepam 10 mg use half tablet to 1 tablet every 8 hours available on as-needed basis.    Gastroesophageal  reflux disease without esophagitis  Omeprazole 20 mg daily without breakthrough symptoms described.    JAMAR on CPAP  Awaiting replacement CPAP machine after recent recall.  Patient states that he did remove the foam from his current machine and was told to continue to use until replacement available.    Class 1 obesity due to excess calories with serious comorbidity and body mass index (BMI) of 32.0 to 32.9 in adult  2 pound weight loss since May 19, 2021.  Continue attempts at dietary and exercise modification for weight goal less than 220 pounds initially, less than 210 pounds ideally.       I have had an Advance Directives discussion with the patient.  The following high BMI interventions were performed this visit: encouragement to exercise, weight monitoring, weight loss from baseline weight and lifestyle education regarding diet.  Ensure ongoing efforts to achieve weight goal < 220 pounds initially, < 210 pounds ideally.           Subjective:     Tyrese Pineda is a 56 year old male who presents for an annual exam.  In general doing well.  Type 2 diabetes.  Glipizide Metformin 5/500 using 2 tablets twice daily.  Had addition of Januvia 100 mg daily at prior office visit May 19, 2021 after A1c increasing from 8.2% up to 9.6% at that time.  Had lost perhaps 4 pounds but now is only down 2 pounds since that visit.  Losartan 100 mg daily for hypertension.  Atorvastatin 20 mg daily for lipid management.  Omeprazole 20 mg daily for reflux without breakthrough symptoms.  CPAP was recalled however does not not have replacement therefore using old CPAP machine without the foam until replacement available.  Has had mild LFT elevation in the past likely component of hepatic steatosis and statin use.  Alcohol as noted below.  Chronic lower back pain with diazepam available on as-needed basis.  Comprehensive review of systems as above otherwise all negative.     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 lupus   Sister and nephew with Celiac disease   No smoke   EtOH: [employer] \"1-2/night\" occ 4 on weekend if party, etc.   Surgeries: 3/00 - bilateral Lasik eye surgery; bilateral inguinal hernia repair 5/2014 (Dr. Edward Merino); bilateral knee arthroscopy and meniscectomy described   12/9/05 - [place] (pneumonia)   Pneumovax 12/05   Tdap 6/25/08   [employer]      Healthy Habits:     Getting at least 3 servings of Calcium per day:  NO    Bi-annual eye exam:  Yes    Dental care twice a year:  Yes    Sleep apnea or symptoms of sleep apnea:  Sleep apnea    Diet:  Regular (no restrictions)    Frequency of exercise:  1 day/week    Duration of exercise:  Less than 15 minutes    Taking medications regularly:  No    Medication side effects:  None    PHQ-2 Total Score: 0    Additional concerns today:  No       Immunization History   Administered Date(s) Administered     COVID-19,PF,Pfizer 03/17/2021, 04/07/2021     DT (PEDS <7y) 01/01/1993, 04/02/2004     Flu, Unspecified 10/01/2015, 10/11/2016     HepA, Unspecified 05/19/2010, 05/04/2011     HepA-Adult 05/19/2010, 05/04/2011     Influenza (IIV3) PF 09/17/2010, 09/26/2012     Influenza Quad, Recombinant, pf(RIV4) (Flublok) 12/14/2018, 12/31/2019, 09/17/2020, 09/21/2021     Influenza Vaccine IM > 6 months Valent IIV4 (Alfuria,Fluzone) 12/18/2013, 12/18/2013, 11/05/2014, 11/05/2014     Influenza Vaccine, 6+MO IM (QUADRIVALENT W/PRESERVATIVES) 09/17/2010, 09/26/2012, 10/01/2015, 10/11/2016, 11/21/2017     Pneumococcal 23 valent 08/15/2014     Td (Adult), Adsorbed 01/01/1993, 04/02/2004     Td,adult,historic,unspecified 01/01/1993, 04/02/2004     Tdap (Adacel,Boostrix) 06/25/2008, 09/07/2011     Zoster vaccine recombinant adjuvanted (SHINGRIX) 08/21/2018, 12/14/2018     Immunization status: Needs Flublok immunization otherwise immunizations up-to-date including completion of " Pfizer COVID-19 vaccine series April 7, 2021.    Current Outpatient Medications   Medication Sig Dispense Refill     aspirin 81 MG EC tablet [ASPIRIN 81 MG EC TABLET] Take 81 mg by mouth daily.       atorvastatin (LIPITOR) 20 MG tablet [ATORVASTATIN (LIPITOR) 20 MG TABLET] TAKE 1 TABLET BY MOUTH EVERY DAY 90 tablet 3     desoximetasone (TOPICORT) 0.05 % cream [DESOXIMETASONE (TOPICORT) 0.05 % CREAM] APPLY SPARINGLY TO AFFECTED AREA TWICE A DAY 60 g 5     diazePAM (VALIUM) 10 MG tablet [DIAZEPAM (VALIUM) 10 MG TABLET] Take 0.5-1 tablets (5-10 mg total) by mouth every 8 (eight) hours as needed (muscle spasm). 60 tablet 2     glipiZIDE-metFORMIN (METAGLIP) 5-500 mg per tablet [GLIPIZIDE-METFORMIN (METAGLIP) 5-500 MG PER TABLET] Take 2 tablets by mouth 2 (two) times a day before meals. 360 tablet 3     losartan (COZAAR) 100 MG tablet [LOSARTAN (COZAAR) 100 MG TABLET] TAKE 1 TABLET BY MOUTH EVERY DAY 90 tablet 3     omeprazole (PRILOSEC) 20 MG capsule [OMEPRAZOLE (PRILOSEC) 20 MG CAPSULE] Take 20 mg by mouth daily.       SITagliptin (JANUVIA) 100 MG tablet [SITAGLIPTIN (JANUVIA) 100 MG TABLET] Take 1 tablet (100 mg total) by mouth daily. With or without food 30 tablet 5     Past Medical History:   Diagnosis Date     Diabetes mellitus (H)      Hypertension      Past Surgical History:   Procedure Laterality Date     HERNIA REPAIR Bilateral May, 2014    Dr. Edward ASCENCIO Bilateral      Lisinopril  Family History   Problem Relation Age of Onset     Celiac Disease Mother      Heart Disease Father      Social History     Socioeconomic History     Marital status:      Spouse name: Not on file     Number of children: Not on file     Years of education: Not on file     Highest education level: Not on file   Occupational History     Not on file   Tobacco Use     Smoking status: Former Smoker     Smokeless tobacco: Never Used   Substance and Sexual Activity     Alcohol use: Yes     Drug use: No     Sexual activity:  "Not on file   Other Topics Concern     Not on file   Social History Narrative     Not on file     Social Determinants of Health     Financial Resource Strain:      Difficulty of Paying Living Expenses:    Food Insecurity:      Worried About Running Out of Food in the Last Year:      Ran Out of Food in the Last Year:    Transportation Needs:      Lack of Transportation (Medical):      Lack of Transportation (Non-Medical):    Physical Activity:      Days of Exercise per Week:      Minutes of Exercise per Session:    Stress:      Feeling of Stress :    Social Connections:      Frequency of Communication with Friends and Family:      Frequency of Social Gatherings with Friends and Family:      Attends Zoroastrian Services:      Active Member of Clubs or Organizations:      Attends Club or Organization Meetings:      Marital Status:    Intimate Partner Violence:      Fear of Current or Ex-Partner:      Emotionally Abused:      Physically Abused:      Sexually Abused:        Review of Systems  Comprehensive ROS: as above, otherwise all negative.           Objective:     /80   Pulse 85   Ht 1.803 m (5' 11\")   Wt 104.3 kg (230 lb)   SpO2 94%   BMI 32.08 kg/m    Body mass index is 32.08 kg/m .    Physical    General Appearance:    Alert, cooperative, no distress, appears stated age.  BMI - 32.08.   Head:    Normocephalic, without obvious abnormality, atraumatic   Eyes:    PERRL, conjunctiva/corneas clear, EOM's intact, fundi     benign, both eyes.  Glasses.        Ears:    Normal TM's and external ear canals, both ears   Nose:   Nares normal, septum midline, mucosa normal, no drainage    or sinus tenderness   Throat:   Lips, mucosa, and tongue normal; teeth and gums normal   Neck:   Supple, symmetrical, trachea midline, no adenopathy;        thyroid:  No enlargement/tenderness/nodules; no carotid    bruit or JVD   Back:     Symmetric, no curvature, ROM normal, no CVA tenderness   Lungs:     Clear to auscultation " bilaterally, respirations unlabored   Chest wall:    No tenderness or deformity   Heart:    Regular rate and rhythm, S1 and S2 normal, no murmur, rub   or gallop   Abdomen:     Soft, non-tender, bowel sounds active all four quadrants,     no masses, no organomegaly.     Genitalia:    Normal male without lesion, discharge or tenderness.  No inguinal hernia noted.     Rectal:    Normal tone.  Prostate normal/symmetric, no masses or tenderness.   Extremities:   Extremities normal, atraumatic, no cyanosis or edema   Pulses:   2+ and symmetric all extremities   Skin:   Skin color, texture, turgor normal, no rashes or lesions   Lymph nodes:   Cervical, supraclavicular, and axillary nodes normal   Neurologic:   CNII-XII intact. Normal strength, sensation and reflexes       Throughout.  Monofilament testing WNL.                This note has been dictated using voice recognition software and as a result may contain minor grammatical errors and unintended word substitutions.

## 2021-10-29 DIAGNOSIS — E11.9 TYPE 2 DIABETES MELLITUS WITHOUT COMPLICATION, WITHOUT LONG-TERM CURRENT USE OF INSULIN (H): ICD-10-CM

## 2021-11-22 ENCOUNTER — NURSE TRIAGE (OUTPATIENT)
Dept: NURSING | Facility: CLINIC | Age: 56
End: 2021-11-22
Payer: COMMERCIAL

## 2021-11-22 NOTE — TELEPHONE ENCOUNTER
We are scheduling all people age 5 and older for COVID-19 vaccines (patients age 5-17 can only receive the Pfizer vaccine).    We are offering third doses of the Pfizer and Moderna COVID-19 vaccines to moderately and severely immunocompromised patients.     [facility] is offering booster doses of Covid-19 vaccination to anyone age 18 and up who got the Aditya and Aditya vaccine 2 or more months ago or Moderna COVID-19 vaccine or Pfizer COVID-19 vaccine 6 months or more ago.    If your initial vaccination was Aditya & Aditya, we recommend getting a Pfizer or Moderna second dose to maximize immunity.  If you received Moderna or Pfizer, you can schedule either Moderna or Pfizer for your booster dose based on convenience or personal preference.  To schedule any COVID-19 vaccination appointment for Moderna or Pfizer, please log in to [employer] using this using this link to see when and where we have openings.  Aditya & Aditya is only offered at our retail pharmacies (and they also offer Moderna and Pfizer) - please call your local [facility] to schedule with them.    If you have technical difficulty using [employer], call [phone], option 1 for assistance.    More information about vaccine effectiveness at reducing spread of disease, hospitalizations, and death as well as vaccine safety and answers to other questions can be found on our website: [email or web address].

## 2021-12-26 DIAGNOSIS — E11.9 TYPE 2 DIABETES MELLITUS WITHOUT COMPLICATION, WITHOUT LONG-TERM CURRENT USE OF INSULIN (H): ICD-10-CM

## 2021-12-28 RX ORDER — GLIPIZIDE AND METFORMIN HCL 5; 500 MG/1; MG/1
TABLET, FILM COATED ORAL
Qty: 360 TABLET | Refills: 0 | Status: SHIPPED | OUTPATIENT
Start: 2021-12-28 | End: 2022-03-22

## 2021-12-29 NOTE — TELEPHONE ENCOUNTER
"Last Written Prescription Date:  01/19/2021  Last Fill Quantity: 360,  # refills: 3   Last office visit provider:  09/21/2021 with Dr Madrid.    Requested Prescriptions   Pending Prescriptions Disp Refills     glipiZIDE-metFORMIN (METAGLIP) 5-500 MG tablet [Pharmacy Med Name: GLIPIZIDE-METFORMIN 5-500 MG] 120 tablet 11     Sig: TAKE 2 TABLETS BY MOUTH 2 (TWO) TIMES A DAY BEFORE MEALS.       Combination Oral Antihyperglycemic Agents Passed - 12/26/2021  9:05 AM        Passed - Patient has documented A1c within the specified period of time.     If HgbA1C is 8 or greater, it needs to be on file within the past 3 months.  If less than 8, must be on file within the past 6 months.     Recent Labs   Lab Test 09/21/21  0652   A1C 7.2*             Passed - Patient's CR is NOT>1.4 OR Patient's EGFR is NOT<45 within past 12 mos.     Recent Labs   Lab Test 09/21/21  0753 05/19/21  0823   GFRESTIMATED >90 >60   GFRESTBLACK  --  >60       Recent Labs   Lab Test 09/21/21  0753   CR 0.93             Passed - Patient does not have a diagnosis of CHF.        Passed - Medication is active on med list        Passed - Patient is 18 years old or older.        Passed - Recent (6 mo) or future (30 days) visit within the authorizing provider's specialty     Patient had office visit in the last 6 months or has a visit in the next 30 days with authorizing provider or within the authorizing provider's specialty.  See \"Patient Info\" tab in inbasket, or \"Choose Columns\" in Meds & Orders section of the refill encounter.                 Lilia Hatch 12/28/21 6:47 PM  "

## 2022-01-21 ENCOUNTER — OFFICE VISIT (OUTPATIENT)
Dept: FAMILY MEDICINE | Facility: CLINIC | Age: 57
End: 2022-01-21
Payer: COMMERCIAL

## 2022-01-21 VITALS
DIASTOLIC BLOOD PRESSURE: 78 MMHG | SYSTOLIC BLOOD PRESSURE: 128 MMHG | BODY MASS INDEX: 32.22 KG/M2 | HEART RATE: 95 BPM | OXYGEN SATURATION: 93 % | WEIGHT: 231 LBS

## 2022-01-21 DIAGNOSIS — I10 ESSENTIAL HYPERTENSION: ICD-10-CM

## 2022-01-21 DIAGNOSIS — G89.29 CHRONIC BILATERAL LOW BACK PAIN WITHOUT SCIATICA: ICD-10-CM

## 2022-01-21 DIAGNOSIS — M54.50 CHRONIC BILATERAL LOW BACK PAIN WITHOUT SCIATICA: ICD-10-CM

## 2022-01-21 DIAGNOSIS — E11.9 TYPE 2 DIABETES MELLITUS WITHOUT COMPLICATION, WITHOUT LONG-TERM CURRENT USE OF INSULIN (H): Primary | ICD-10-CM

## 2022-01-21 DIAGNOSIS — L30.9 DERMATITIS: ICD-10-CM

## 2022-01-21 DIAGNOSIS — R79.89 ABNORMAL LFTS: ICD-10-CM

## 2022-01-21 LAB
ALBUMIN SERPL-MCNC: 4.2 G/DL (ref 3.5–5)
ALP SERPL-CCNC: 78 U/L (ref 45–120)
ALT SERPL W P-5'-P-CCNC: 57 U/L (ref 0–45)
ANION GAP SERPL CALCULATED.3IONS-SCNC: 10 MMOL/L (ref 5–18)
AST SERPL W P-5'-P-CCNC: 38 U/L (ref 0–40)
BILIRUB SERPL-MCNC: 0.9 MG/DL (ref 0–1)
BUN SERPL-MCNC: 11 MG/DL (ref 8–22)
CALCIUM SERPL-MCNC: 10.1 MG/DL (ref 8.5–10.5)
CHLORIDE BLD-SCNC: 103 MMOL/L (ref 98–107)
CO2 SERPL-SCNC: 27 MMOL/L (ref 22–31)
CREAT SERPL-MCNC: 1.12 MG/DL (ref 0.7–1.3)
GFR SERPL CREATININE-BSD FRML MDRD: 77 ML/MIN/1.73M2
GLUCOSE BLD-MCNC: 150 MG/DL (ref 70–125)
HBA1C MFR BLD: 8.5 % (ref 0–5.6)
POTASSIUM BLD-SCNC: 4.7 MMOL/L (ref 3.5–5)
PROT SERPL-MCNC: 7.4 G/DL (ref 6–8)
SODIUM SERPL-SCNC: 140 MMOL/L (ref 136–145)

## 2022-01-21 PROCEDURE — 99214 OFFICE O/P EST MOD 30 MIN: CPT | Performed by: FAMILY MEDICINE

## 2022-01-21 PROCEDURE — 36415 COLL VENOUS BLD VENIPUNCTURE: CPT | Performed by: FAMILY MEDICINE

## 2022-01-21 PROCEDURE — 80053 COMPREHEN METABOLIC PANEL: CPT | Performed by: FAMILY MEDICINE

## 2022-01-21 PROCEDURE — 83036 HEMOGLOBIN GLYCOSYLATED A1C: CPT | Performed by: FAMILY MEDICINE

## 2022-01-21 RX ORDER — LOSARTAN POTASSIUM 100 MG/1
TABLET ORAL
Qty: 90 TABLET | Refills: 3 | Status: SHIPPED | OUTPATIENT
Start: 2022-01-21 | End: 2022-12-31

## 2022-01-21 RX ORDER — DESOXIMETASONE 0.5 MG/G
CREAM TOPICAL
Qty: 60 G | Refills: 5 | Status: SHIPPED | OUTPATIENT
Start: 2022-01-21 | End: 2023-06-26

## 2022-01-21 RX ORDER — DIAZEPAM 10 MG
5-10 TABLET ORAL EVERY 8 HOURS PRN
Qty: 60 TABLET | Refills: 2 | Status: SHIPPED | OUTPATIENT
Start: 2022-01-21 | End: 2023-06-26

## 2022-01-21 NOTE — PROGRESS NOTES
Assessment/Plan:    Type 2 diabetes mellitus without complication, without long-term current use of insulin (H)  Type 2 diabetes with worsening.  A1c increasing from 7.2% up to 8.5% with dietary indiscretion.  Continues use of Januvia 100 mg daily and glipizide metformin 5/500 using 2 tablets twice daily.  Weight goal remains less than 220 pounds initially, less than 210 pounds ideally.  Recent microalbumin screen normal September 21, 2021.  Patient declines referral to diabetes education nor additional medication at this time.  Reassess at follow-up in 4 months.  - Hemoglobin A1c  - Hemoglobin A1c  -Comprehensive metabolic panel    Essential hypertension  Hypertension well-controlled.  Losartan 100 mg daily refilled.  Med monitoring completed.  - losartan (COZAAR) 100 MG tablet  Dispense: 90 tablet; Refill: 3  -Comprehensive metabolic panel    Chronic bilateral low back pain without sciatica  Utilizes diazepam 10 mg taking half tablet to 1 tablet at bedtime on as-needed basis for chronic lower back pain with muscle spasm.  - diazepam (VALIUM) 10 MG tablet  Dispense: 60 tablet; Refill: 2    Dermatitis  Topicort refilled for dermatitis management.  We will consider dermatology referral in the spring of persistent issues around left eyebrow etc.  - desoximetasone (TOPICORT LP) 0.05 % external cream  Dispense: 60 g; Refill: 5    Abnormal LFTs  Comprehensive metabolic panel to ensure stable with mild elevation secondary to likely statin therapy as well as hepatic steatosis  -Comprehensive metabolic panel      Will need tetanus booster however patient declines today and would receive at follow-up in 4 months.      The following high BMI interventions were performed this visit: encouragement to exercise, weight monitoring, weight loss from baseline weight and lifestyle education regarding diet .  Ensure ongoing efforts to achieve weight goal < 220 pounds initially, < 210 pounds ideally.         Subjective:    Tyrese HAMILTON  "Edwin is seen today for follow-up assessment.  Type 2 diabetes.   Addition 5/19/21 of Januvia 100 mg daily while continuing glipizide Metformin 5/500 using 2 tablets twice daily. Had physical September 21, 2022 with A1c improvement at that time from 9.6% down to 7.2%.   Recent diabetic eye exam August 16, 2021 without retinopathy.  Monofilament testing was normal.  Continues Januvia 100 mg daily and glipizide metformin 5/500 using 2 tablets twice daily without GI distress or diarrhea.  Losartan 100 mg daily for hypertension.  Atorvastatin 20 mg daily for lipid management.  Has had mild LFT elevation historically secondary to statin and likely component of hepatic steatosis.  Chronic low back pain does use diazepam on as-needed basis for muscle spasm management.  Omeprazole 20 mg daily for reflux.  CPAP for JAMAR management tolerated well.  Comprehensive review of system as above otherwise all negative.  Has received third shot booster for COVID-19 vaccination.  Not aware that he has had Covid-19 infection previously.  Comprehensive review of systems as above otherwise all negative.     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 lupus   Sister and nephew with Celiac disease   No smoke   EtOH: [employer] \"1-2/night\" occ 4 on weekend if party, etc.   Surgeries: 3/00 - bilateral Lasik eye surgery; bilateral inguinal hernia repair 5/2014 (Dr. Edward Merino); bilateral knee arthroscopy and meniscectomy described   12/9/05 - [place]'s (pneumonia)   Pneumovax 12/05   Tdap 6/25/08   [employer]    Past Surgical History:   Procedure Laterality Date     HERNIA REPAIR Bilateral May, 2014    Dr. Edward ASCENCIO Bilateral         Family History   Problem Relation Age of Onset     Celiac Disease Mother      Heart Disease Father         Past Medical History:   Diagnosis Date     Diabetes mellitus (H)      " Hypertension         Social History     Tobacco Use     Smoking status: Former Smoker     Smokeless tobacco: Never Used   Substance Use Topics     Alcohol use: Yes     Drug use: No        Current Outpatient Medications   Medication Sig Dispense Refill     aspirin 81 MG EC tablet [ASPIRIN 81 MG EC TABLET] Take 81 mg by mouth daily.       atorvastatin (LIPITOR) 20 MG tablet [ATORVASTATIN (LIPITOR) 20 MG TABLET] TAKE 1 TABLET BY MOUTH EVERY DAY 90 tablet 3     desoximetasone (TOPICORT LP) 0.05 % external cream [DESOXIMETASONE (TOPICORT) 0.05 % CREAM] APPLY SPARINGLY TO AFFECTED AREA TWICE A DAY 60 g 5     diazepam (VALIUM) 10 MG tablet Take 0.5-1 tablets (5-10 mg) by mouth every 8 hours as needed for muscle spasms 60 tablet 2     glipiZIDE-metFORMIN (METAGLIP) 5-500 MG tablet TAKE 2 TABLETS BY MOUTH 2 (TWO) TIMES A DAY BEFORE MEALS. 360 tablet 0     losartan (COZAAR) 100 MG tablet [LOSARTAN (COZAAR) 100 MG TABLET] TAKE 1 TABLET BY MOUTH EVERY DAY 90 tablet 3     omeprazole (PRILOSEC) 20 MG capsule [OMEPRAZOLE (PRILOSEC) 20 MG CAPSULE] Take 20 mg by mouth daily.       sitagliptin (JANUVIA) 100 MG tablet Take 1 tablet (100 mg) by mouth daily 30 tablet 5          Objective:    Vitals:    01/21/22 0837   BP: 128/78   Pulse: 95   SpO2: 93%   Weight: 104.8 kg (231 lb)      Body mass index is 32.22 kg/m .    Alert.  No apparent distress.  Chest clear.  Cardiac exam regular.  Blood pressure 128/78.  Extremities warm and dry.  Mild dermatitis medial aspect of left eyebrow without pustule or ulceration or hyperkeratotic appearance.      This note has been dictated using voice recognition software and as a result may contain minor grammatical errors and unintended word substitutions.

## 2022-02-08 DIAGNOSIS — E11.9 TYPE 2 DIABETES MELLITUS WITHOUT COMPLICATION, WITHOUT LONG-TERM CURRENT USE OF INSULIN (H): Primary | ICD-10-CM

## 2022-02-08 DIAGNOSIS — E11.9 TYPE 2 DIABETES MELLITUS WITHOUT COMPLICATION, WITHOUT LONG-TERM CURRENT USE OF INSULIN (H): ICD-10-CM

## 2022-02-08 RX ORDER — PIOGLITAZONEHYDROCHLORIDE 30 MG/1
30 TABLET ORAL DAILY
Qty: 90 TABLET | Refills: 3 | Status: SHIPPED | OUTPATIENT
Start: 2022-02-08 | End: 2022-02-08

## 2022-02-08 RX ORDER — PIOGLITAZONEHYDROCHLORIDE 30 MG/1
30 TABLET ORAL DAILY
Qty: 90 TABLET | Refills: 3 | Status: SHIPPED | OUTPATIENT
Start: 2022-02-08 | End: 2023-01-27

## 2022-02-19 DIAGNOSIS — E78.5 HYPERLIPIDEMIA: ICD-10-CM

## 2022-02-21 NOTE — TELEPHONE ENCOUNTER
"Routing refill request to provider for review/approval because:  Early refill request    Last Written Prescription Date:  3/22/2021  Last Fill Quantity: 90,  # refills: 3   Last office visit provider:  1/21/2022 Dr. Madrid     atorvastatin (LIPITOR) 20 MG tablet 90 tablet 3 3/22/2021  No   Sig: TAKE 1 TABLET BY MOUTH EVERY DAY   Sent to pharmacy as: atorvastatin 20 mg tablet (LIPITOR)   E-Prescribing Status: Receipt confirmed by pharmacy (3/22/2021  9:54 PM CDT       Requested Prescriptions   Pending Prescriptions Disp Refills     atorvastatin (LIPITOR) 20 MG tablet [Pharmacy Med Name: ATORVASTATIN 20 MG TABLET] 30 tablet 11     Sig: TAKE 1 TABLET BY MOUTH EVERY DAY       Statins Protocol Passed - 2/19/2022 12:15 AM        Passed - LDL on file in past 12 months     Recent Labs   Lab Test 09/21/21  0753   LDL 66             Passed - No abnormal creatine kinase in past 12 months     No lab results found.             Passed - Recent (12 mo) or future (30 days) visit within the authorizing provider's specialty     Patient has had an office visit with the authorizing provider or a provider within the authorizing providers department within the previous 12 mos or has a future within next 30 days. See \"Patient Info\" tab in inbasket, or \"Choose Columns\" in Meds & Orders section of the refill encounter.              Passed - Medication is active on med list        Passed - Patient is age 18 or older             Janene Swartz RN 02/21/22 11:53 AM  "

## 2022-02-22 RX ORDER — ATORVASTATIN CALCIUM 20 MG/1
TABLET, FILM COATED ORAL
Qty: 30 TABLET | Refills: 11 | Status: SHIPPED | OUTPATIENT
Start: 2022-02-22 | End: 2023-01-31

## 2022-03-21 DIAGNOSIS — E11.9 TYPE 2 DIABETES MELLITUS WITHOUT COMPLICATION, WITHOUT LONG-TERM CURRENT USE OF INSULIN (H): ICD-10-CM

## 2022-03-22 RX ORDER — GLIPIZIDE AND METFORMIN HCL 5; 500 MG/1; MG/1
TABLET, FILM COATED ORAL
Qty: 360 TABLET | Refills: 0 | Status: SHIPPED | OUTPATIENT
Start: 2022-03-22 | End: 2022-08-07

## 2022-03-22 NOTE — TELEPHONE ENCOUNTER
"Last Written Prescription Date:  12/28/2021  Last Fill Quantity: 360,  # refills: 0   Last office visit provider:  1/21/2022 Dr. Madrid     Requested Prescriptions   Pending Prescriptions Disp Refills     glipiZIDE-metFORMIN (METAGLIP) 5-500 MG tablet [Pharmacy Med Name: GLIPIZIDE-METFORMIN 5-500 MG] 120 tablet 2     Sig: TAKE 2 TABLETS BY MOUTH 2 TIMES A DAY BEFORE MEALS.       Combination Oral Antihyperglycemic Agents Passed - 3/21/2022 12:29 AM        Passed - Patient has documented A1c within the specified period of time.     If HgbA1C is 8 or greater, it needs to be on file within the past 3 months.  If less than 8, must be on file within the past 6 months.     Recent Labs   Lab Test 01/21/22  0849   A1C 8.5*             Passed - Patient's CR is NOT>1.4 OR Patient's EGFR is NOT<45 within past 12 mos.     Recent Labs   Lab Test 01/21/22  0948 09/21/21  0753 05/19/21  0823   GFRESTIMATED 77   < > >60   GFRESTBLACK  --   --  >60    < > = values in this interval not displayed.       Recent Labs   Lab Test 01/21/22  0948   CR 1.12             Passed - Patient does not have a diagnosis of CHF.        Passed - Medication is active on med list        Passed - Patient is 18 years old or older.        Passed - Recent (6 mo) or future (30 days) visit within the authorizing provider's specialty     Patient had office visit in the last 6 months or has a visit in the next 30 days with authorizing provider or within the authorizing provider's specialty.  See \"Patient Info\" tab in inbasket, or \"Choose Columns\" in Meds & Orders section of the refill encounter.                 Janene Swartz RN 03/22/22 9:11 AM  " Patient to Meet>75% of Nutrition Needs During Current Hospitalization

## 2022-05-27 ENCOUNTER — OFFICE VISIT (OUTPATIENT)
Dept: FAMILY MEDICINE | Facility: CLINIC | Age: 57
End: 2022-05-27
Payer: COMMERCIAL

## 2022-05-27 VITALS
SYSTOLIC BLOOD PRESSURE: 120 MMHG | OXYGEN SATURATION: 92 % | WEIGHT: 238 LBS | DIASTOLIC BLOOD PRESSURE: 80 MMHG | HEART RATE: 88 BPM | BODY MASS INDEX: 33.19 KG/M2

## 2022-05-27 DIAGNOSIS — R79.89 ABNORMAL LFTS: ICD-10-CM

## 2022-05-27 DIAGNOSIS — E11.9 TYPE 2 DIABETES MELLITUS WITHOUT COMPLICATION, WITHOUT LONG-TERM CURRENT USE OF INSULIN (H): Primary | ICD-10-CM

## 2022-05-27 DIAGNOSIS — E78.5 HYPERLIPIDEMIA, UNSPECIFIED HYPERLIPIDEMIA TYPE: ICD-10-CM

## 2022-05-27 DIAGNOSIS — Z23 ENCOUNTER FOR IMMUNIZATION: ICD-10-CM

## 2022-05-27 DIAGNOSIS — Z12.83 SKIN CANCER SCREENING: ICD-10-CM

## 2022-05-27 DIAGNOSIS — I10 ESSENTIAL HYPERTENSION: ICD-10-CM

## 2022-05-27 LAB
ALBUMIN SERPL-MCNC: 3.7 G/DL (ref 3.5–5)
ALP SERPL-CCNC: 72 U/L (ref 45–120)
ALT SERPL W P-5'-P-CCNC: 30 U/L (ref 0–45)
ANION GAP SERPL CALCULATED.3IONS-SCNC: 11 MMOL/L (ref 5–18)
AST SERPL W P-5'-P-CCNC: 24 U/L (ref 0–40)
BILIRUB SERPL-MCNC: 0.9 MG/DL (ref 0–1)
BUN SERPL-MCNC: 10 MG/DL (ref 8–22)
CALCIUM SERPL-MCNC: 9.4 MG/DL (ref 8.5–10.5)
CHLORIDE BLD-SCNC: 104 MMOL/L (ref 98–107)
CO2 SERPL-SCNC: 25 MMOL/L (ref 22–31)
CREAT SERPL-MCNC: 0.9 MG/DL (ref 0.7–1.3)
GFR SERPL CREATININE-BSD FRML MDRD: >90 ML/MIN/1.73M2
GLUCOSE BLD-MCNC: 144 MG/DL (ref 70–125)
HBA1C MFR BLD: 7.7 % (ref 0–5.6)
HOLD SPECIMEN: NORMAL
POTASSIUM BLD-SCNC: 4.4 MMOL/L (ref 3.5–5)
PROT SERPL-MCNC: 6.8 G/DL (ref 6–8)
SODIUM SERPL-SCNC: 140 MMOL/L (ref 136–145)

## 2022-05-27 PROCEDURE — 90471 IMMUNIZATION ADMIN: CPT | Performed by: FAMILY MEDICINE

## 2022-05-27 PROCEDURE — 80053 COMPREHEN METABOLIC PANEL: CPT | Performed by: FAMILY MEDICINE

## 2022-05-27 PROCEDURE — 99214 OFFICE O/P EST MOD 30 MIN: CPT | Mod: 25 | Performed by: FAMILY MEDICINE

## 2022-05-27 PROCEDURE — 90715 TDAP VACCINE 7 YRS/> IM: CPT | Performed by: FAMILY MEDICINE

## 2022-05-27 PROCEDURE — 36415 COLL VENOUS BLD VENIPUNCTURE: CPT | Performed by: FAMILY MEDICINE

## 2022-05-27 PROCEDURE — 83036 HEMOGLOBIN GLYCOSYLATED A1C: CPT | Performed by: FAMILY MEDICINE

## 2022-05-27 ASSESSMENT — PAIN SCALES - GENERAL: PAINLEVEL: MODERATE PAIN (4)

## 2022-05-27 NOTE — PROGRESS NOTES
Assessment/Plan:     Type 2 diabetes mellitus without complication, without long-term current use of insulin (H)  Type 2 diabetes.  A1c improved from 8.5% down to 7.7% today.  Med monitoring completed.  Prior eye exam August 16, 2021 and will schedule for annual eye exam.  Foot exam was normal September 21, 2021 and will repeat a physical exam in 4 months.  Microalbumin screen normal September 21, 2021 as well.  - Hemoglobin A1c  - Comprehensive metabolic panel  - Comprehensive metabolic panel    Essential hypertension  Hypertension well controlled on losartan 100 mg daily.  - Comprehensive metabolic panel  - Comprehensive metabolic panel    Hyperlipidemia, unspecified hyperlipidemia type  Continues atorvastatin 20 mg daily with lipid cascade near goal September 21, 2021 we will repeat at physical exam in 4 months    Abnormal LFTs  Mild LFT elevation likely secondary to hepatic steatosis as well as statin use.  Ensure stable.  - Comprehensive metabolic panel  - Comprehensive metabolic panel    Encounter for immunization  Adacel booster provided  - TDAP VACCINE (Adacel, Boostrix)  [[id number]]    Skin cancer screening  Referral to advanced dermatology per patient request for skin check.  - Adult Dermatology Referral            Subjective:     Tyrese HAMILTON Edwin is a 57 year old male who presents for an annual exam.  In general doing well.  Type 2 diabetes.  Continues Januvia 100 mg daily and glipizide metformin 5/500 using 2 tablets twice daily.  No hypoglycemic episodes.  No nausea or diarrhea.  Prior A1c had increased from 7.2% up to 8.5% January 21, 2022.  7 pound weight gain since this time.  Using losartan 100 mg daily for hypertension.  Mild LFT elevation with prior ALT 57.  Would like skin check completed.  Covid-19 first booster provided December 6, 2021 and wants to wait for updated version which he feels will be released in the next 6 weeks.  Comprehensive review of systems as above otherwise all  "negative.       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 lupus   Sister and nephew with Celiac disease   No smoke   EtOH: [employer] \"1-2/night\" occ 4 on weekend if party, etc.   Surgeries: 3/00 - bilateral Lasik eye surgery; bilateral inguinal hernia repair 5/2014 (Dr. Edward Merino); bilateral knee arthroscopy and meniscectomy described   12/9/05 - [place]'s (pneumonia)   Pneumovax 12/05   Tdap 6/25/08   [employer]cycle      Healthy Habits:   History of Present Illness       Diabetes:   He presents for follow up of diabetes.  He is not checking blood glucose. He has no concerns regarding his diabetes at this time.  He is having weight gain.         Hypertension: He presents for follow up of hypertension.  He does not check blood pressure  regularly outside of the clinic. Outpatient blood pressures have not been over 140/90. He does not follow a low salt diet.         Immunization History   Administered Date(s) Administered     COVID-19,PF,Pfizer (12+ Yrs) 03/17/2021, 04/07/2021, 12/06/2021     DT (PEDS <7y) 01/01/1993, 04/02/2004     Flu, Unspecified 10/01/2015, 10/11/2016     HepA, Unspecified 05/19/2010, 05/04/2011     HepA-Adult 05/19/2010, 05/04/2011     Influenza (IIV3) PF 09/17/2010, 09/26/2012     Influenza Quad, Recombinant, pf(RIV4) (Flublok) 12/14/2018, 12/31/2019, 09/17/2020, 09/21/2021     Influenza Vaccine IM > 6 months Valent IIV4 (Alfuria,Fluzone) 12/18/2013, 12/18/2013, 11/05/2014, 11/05/2014     Influenza Vaccine, 6+MO IM (QUADRIVALENT W/PRESERVATIVES) 09/17/2010, 09/26/2012, 10/01/2015, 10/11/2016, 11/21/2017     Pneumococcal 23 valent 08/15/2014     Td (Adult), Adsorbed 01/01/1993, 04/02/2004     Td,adult,historic,unspecified 01/01/1993, 04/02/2004     Tdap (Adacel,Boostrix) 06/25/2008, 09/07/2011     Zoster vaccine recombinant adjuvanted (SHINGRIX) 08/21/2018, 12/14/2018 "     Immunization status: Needs Adacel booster today.  Declines Covid-19 Pfizer second booster currently.    Current Outpatient Medications   Medication Sig Dispense Refill     aspirin 81 MG EC tablet [ASPIRIN 81 MG EC TABLET] Take 81 mg by mouth daily.       atorvastatin (LIPITOR) 20 MG tablet TAKE 1 TABLET BY MOUTH EVERY DAY 30 tablet 11     desoximetasone (TOPICORT LP) 0.05 % external cream [DESOXIMETASONE (TOPICORT) 0.05 % CREAM] APPLY SPARINGLY TO AFFECTED AREA TWICE A DAY 60 g 5     diazepam (VALIUM) 10 MG tablet Take 0.5-1 tablets (5-10 mg) by mouth every 8 hours as needed for muscle spasms 60 tablet 2     glipiZIDE-metFORMIN (METAGLIP) 5-500 MG tablet TAKE 2 TABLETS BY MOUTH 2 TIMES A DAY BEFORE MEALS. 360 tablet 0     losartan (COZAAR) 100 MG tablet [LOSARTAN (COZAAR) 100 MG TABLET] TAKE 1 TABLET BY MOUTH EVERY DAY 90 tablet 3     omeprazole (PRILOSEC) 20 MG capsule [OMEPRAZOLE (PRILOSEC) 20 MG CAPSULE] Take 20 mg by mouth daily.       pioglitazone (ACTOS) 30 MG tablet Take 1 tablet (30 mg) by mouth daily 90 tablet 3     Past Medical History:   Diagnosis Date     Diabetes mellitus (H)      Hypertension      Past Surgical History:   Procedure Laterality Date     HERNIA REPAIR Bilateral May, 2014    Dr. Edward ASCENCIO Bilateral      Lisinopril  Family History   Problem Relation Age of Onset     Celiac Disease Mother      Heart Disease Father      Social History     Socioeconomic History     Marital status:      Spouse name: Not on file     Number of children: Not on file     Years of education: Not on file     Highest education level: Not on file   Occupational History     Not on file   Tobacco Use     Smoking status: Former Smoker     Smokeless tobacco: Never Used   Substance and Sexual Activity     Alcohol use: Yes     Drug use: No     Sexual activity: Not on file   Other Topics Concern     Not on file   Social History Narrative     Not on file     Social Determinants of Health     Financial  Resource Strain: Not on file   Food Insecurity: Not on file   Transportation Needs: Not on file   Physical Activity: Not on file   Stress: Not on file   Social Connections: Not on file   Intimate Partner Violence: Not on file   Housing Stability: Not on file       Review of Systems  Comprehensive ROS: as above, otherwise all negative.           Objective:     /80   Pulse 88   Wt 108 kg (238 lb)   SpO2 92%   BMI 33.19 kg/m    Body mass index is 33.19 kg/m .    Exam:    Alert.  No apparent distress.  Chest clear.  Cardiac exam regular.  Tanned skin.  Extremities warm and dry.                                                                                               This note has been dictated using voice recognition software and as a result may contain minor grammatical errors and unintended word substitutions.       Answers for HPI/ROS submitted by the patient on 5/27/2022  Do you check your blood pressure regularly outside of the clinic?: No  Are your blood pressures ever more than 140 on the top number (systolic) OR more than 90 on the bottom number (diastolic)? (For example, greater than 140/90): No  Are you following a low salt diet?: No  Frequency of checking blood sugars:: not at all  Diabetic concerns:: none  Paraesthesia present:: weight gain

## 2022-07-14 ENCOUNTER — TRANSFERRED RECORDS (OUTPATIENT)
Dept: HEALTH INFORMATION MANAGEMENT | Facility: CLINIC | Age: 57
End: 2022-07-14

## 2022-08-07 DIAGNOSIS — E11.9 TYPE 2 DIABETES MELLITUS WITHOUT COMPLICATION, WITHOUT LONG-TERM CURRENT USE OF INSULIN (H): ICD-10-CM

## 2022-08-07 RX ORDER — GLIPIZIDE AND METFORMIN HCL 5; 500 MG/1; MG/1
TABLET, FILM COATED ORAL
Qty: 360 TABLET | Refills: 3 | Status: SHIPPED | OUTPATIENT
Start: 2022-08-07 | End: 2023-01-31

## 2022-08-07 NOTE — TELEPHONE ENCOUNTER
"Last Written Prescription Date:  3/22/22  Last Fill Quantity: 360,  # refills: 0   Last office visit provider:  5/27/22          Requested Prescriptions   Pending Prescriptions Disp Refills     glipiZIDE-metFORMIN (METAGLIP) 5-500 MG tablet [Pharmacy Med Name: GLIPIZIDE-METFORMIN 5-500 MG] 120 tablet 2     Sig: TAKE 2 TABLETS BY MOUTH 2 TIMES A DAY BEFORE MEALS.       Combination Oral Antihyperglycemic Agents Passed - 8/7/2022 12:39 AM        Passed - Patient has documented A1c within the specified period of time.     If HgbA1C is 8 or greater, it needs to be on file within the past 3 months.  If less than 8, must be on file within the past 6 months.     Recent Labs   Lab Test 05/27/22  0838   A1C 7.7*             Passed - Patient's CR is NOT>1.4 OR Patient's EGFR is NOT<45 within past 12 mos.     Recent Labs   Lab Test 05/27/22  0838 09/21/21  0753 05/19/21  0823   GFRESTIMATED >90   < > >60   GFRESTBLACK  --   --  >60    < > = values in this interval not displayed.       Recent Labs   Lab Test 05/27/22  0838   CR 0.90             Passed - Patient does not have a diagnosis of CHF.        Passed - Medication is active on med list        Passed - Patient is 18 years old or older.        Passed - Recent (6 mo) or future (30 days) visit within the authorizing provider's specialty     Patient had office visit in the last 6 months or has a visit in the next 30 days with authorizing provider or within the authorizing provider's specialty.  See \"Patient Info\" tab in inbasket, or \"Choose Columns\" in Meds & Orders section of the refill encounter.                 Roberta Gross RN 08/07/22 2:05 AM  "

## 2022-08-19 ENCOUNTER — TRANSFERRED RECORDS (OUTPATIENT)
Dept: HEALTH INFORMATION MANAGEMENT | Facility: CLINIC | Age: 57
End: 2022-08-19

## 2022-08-19 LAB — RETINOPATHY: NEGATIVE

## 2022-09-20 ENCOUNTER — TRANSFERRED RECORDS (OUTPATIENT)
Dept: HEALTH INFORMATION MANAGEMENT | Facility: CLINIC | Age: 57
End: 2022-09-20

## 2022-09-29 PROBLEM — G47.33 SEVERE OBSTRUCTIVE SLEEP APNEA: Status: ACTIVE | Noted: 2020-04-16

## 2022-09-29 PROBLEM — I10 ESSENTIAL HYPERTENSION: Status: ACTIVE | Noted: 2020-02-11

## 2022-09-29 PROBLEM — E11.9 TYPE 2 DIABETES MELLITUS (H): Status: ACTIVE | Noted: 2020-02-11

## 2022-09-29 PROBLEM — K21.9 GASTROESOPHAGEAL REFLUX DISEASE WITHOUT ESOPHAGITIS: Status: ACTIVE | Noted: 2020-03-02

## 2022-09-29 RX ORDER — CLINDAMYCIN PHOSPHATE 10 MG/G
GEL TOPICAL
COMMUNITY
Start: 2022-07-14 | End: 2023-06-26

## 2022-09-30 ENCOUNTER — OFFICE VISIT (OUTPATIENT)
Dept: FAMILY MEDICINE | Facility: CLINIC | Age: 57
End: 2022-09-30
Attending: FAMILY MEDICINE
Payer: COMMERCIAL

## 2022-09-30 VITALS
BODY MASS INDEX: 33.5 KG/M2 | DIASTOLIC BLOOD PRESSURE: 82 MMHG | HEIGHT: 71 IN | TEMPERATURE: 98 F | WEIGHT: 239.3 LBS | HEART RATE: 96 BPM | OXYGEN SATURATION: 96 % | SYSTOLIC BLOOD PRESSURE: 126 MMHG

## 2022-09-30 DIAGNOSIS — E66.09 CLASS 1 OBESITY DUE TO EXCESS CALORIES WITH SERIOUS COMORBIDITY AND BODY MASS INDEX (BMI) OF 33.0 TO 33.9 IN ADULT: ICD-10-CM

## 2022-09-30 DIAGNOSIS — G47.33 OSA ON CPAP: ICD-10-CM

## 2022-09-30 DIAGNOSIS — I10 ESSENTIAL HYPERTENSION: ICD-10-CM

## 2022-09-30 DIAGNOSIS — R79.89 ABNORMAL LFTS: ICD-10-CM

## 2022-09-30 DIAGNOSIS — E11.9 TYPE 2 DIABETES MELLITUS WITHOUT COMPLICATION, WITHOUT LONG-TERM CURRENT USE OF INSULIN (H): ICD-10-CM

## 2022-09-30 DIAGNOSIS — Z00.00 ROUTINE PHYSICAL EXAMINATION: Primary | ICD-10-CM

## 2022-09-30 DIAGNOSIS — K21.9 GASTROESOPHAGEAL REFLUX DISEASE WITHOUT ESOPHAGITIS: ICD-10-CM

## 2022-09-30 DIAGNOSIS — E66.811 CLASS 1 OBESITY DUE TO EXCESS CALORIES WITH SERIOUS COMORBIDITY AND BODY MASS INDEX (BMI) OF 33.0 TO 33.9 IN ADULT: ICD-10-CM

## 2022-09-30 DIAGNOSIS — Z23 ENCOUNTER FOR IMMUNIZATION: ICD-10-CM

## 2022-09-30 DIAGNOSIS — E78.5 HYPERLIPIDEMIA, UNSPECIFIED HYPERLIPIDEMIA TYPE: ICD-10-CM

## 2022-09-30 LAB
ALBUMIN SERPL BCG-MCNC: 4.5 G/DL (ref 3.5–5.2)
ALP SERPL-CCNC: 74 U/L (ref 40–129)
ALT SERPL W P-5'-P-CCNC: 36 U/L (ref 10–50)
ANION GAP SERPL CALCULATED.3IONS-SCNC: 12 MMOL/L (ref 7–15)
AST SERPL W P-5'-P-CCNC: 32 U/L (ref 10–50)
BILIRUB SERPL-MCNC: 0.6 MG/DL
BUN SERPL-MCNC: 15.2 MG/DL (ref 6–20)
CALCIUM SERPL-MCNC: 9.8 MG/DL (ref 8.6–10)
CHLORIDE SERPL-SCNC: 102 MMOL/L (ref 98–107)
CHOLEST SERPL-MCNC: 145 MG/DL
CREAT SERPL-MCNC: 1.07 MG/DL (ref 0.67–1.17)
CREAT UR-MCNC: 286 MG/DL
DEPRECATED HCO3 PLAS-SCNC: 25 MMOL/L (ref 22–29)
GFR SERPL CREATININE-BSD FRML MDRD: 80 ML/MIN/1.73M2
GLUCOSE SERPL-MCNC: 141 MG/DL (ref 70–99)
HBA1C MFR BLD: 7.3 % (ref 0–5.6)
HDLC SERPL-MCNC: 43 MG/DL
HOLD SPECIMEN: NORMAL
LDLC SERPL CALC-MCNC: 67 MG/DL
MICROALBUMIN UR-MCNC: <12 MG/L
MICROALBUMIN/CREAT UR: NORMAL MG/G{CREAT}
NONHDLC SERPL-MCNC: 102 MG/DL
POTASSIUM SERPL-SCNC: 4.7 MMOL/L (ref 3.4–5.3)
PROT SERPL-MCNC: 7.2 G/DL (ref 6.4–8.3)
SODIUM SERPL-SCNC: 139 MMOL/L (ref 136–145)
TRIGL SERPL-MCNC: 174 MG/DL

## 2022-09-30 PROCEDURE — 90682 RIV4 VACC RECOMBINANT DNA IM: CPT | Performed by: FAMILY MEDICINE

## 2022-09-30 PROCEDURE — 80061 LIPID PANEL: CPT | Performed by: FAMILY MEDICINE

## 2022-09-30 PROCEDURE — 91312 COVID-19,PF,PFIZER BOOSTER BIVALENT: CPT | Performed by: FAMILY MEDICINE

## 2022-09-30 PROCEDURE — 90677 PCV20 VACCINE IM: CPT | Performed by: FAMILY MEDICINE

## 2022-09-30 PROCEDURE — 36415 COLL VENOUS BLD VENIPUNCTURE: CPT | Performed by: FAMILY MEDICINE

## 2022-09-30 PROCEDURE — 0124A COVID-19,PF,PFIZER BOOSTER BIVALENT: CPT | Performed by: FAMILY MEDICINE

## 2022-09-30 PROCEDURE — 83036 HEMOGLOBIN GLYCOSYLATED A1C: CPT | Performed by: FAMILY MEDICINE

## 2022-09-30 PROCEDURE — 99396 PREV VISIT EST AGE 40-64: CPT | Mod: 25 | Performed by: FAMILY MEDICINE

## 2022-09-30 PROCEDURE — 90471 IMMUNIZATION ADMIN: CPT | Performed by: FAMILY MEDICINE

## 2022-09-30 PROCEDURE — 82043 UR ALBUMIN QUANTITATIVE: CPT | Performed by: FAMILY MEDICINE

## 2022-09-30 PROCEDURE — 90472 IMMUNIZATION ADMIN EACH ADD: CPT | Performed by: FAMILY MEDICINE

## 2022-09-30 PROCEDURE — 99214 OFFICE O/P EST MOD 30 MIN: CPT | Mod: 25 | Performed by: FAMILY MEDICINE

## 2022-09-30 PROCEDURE — 80053 COMPREHEN METABOLIC PANEL: CPT | Performed by: FAMILY MEDICINE

## 2022-09-30 ASSESSMENT — ENCOUNTER SYMPTOMS
CONSTIPATION: 1
SHORTNESS OF BREATH: 1
JOINT SWELLING: 0
EYE PAIN: 0
MYALGIAS: 0
WEAKNESS: 0
FREQUENCY: 0
HEMATURIA: 0
NERVOUS/ANXIOUS: 0
HEADACHES: 0
HEARTBURN: 1
SORE THROAT: 0
FEVER: 0
ARTHRALGIAS: 1
DIARRHEA: 0
COUGH: 1
PALPITATIONS: 0
CHILLS: 0
PARESTHESIAS: 0
ABDOMINAL PAIN: 0
DIZZINESS: 0
NAUSEA: 0
HEMATOCHEZIA: 0
DYSURIA: 0

## 2022-09-30 ASSESSMENT — PAIN SCALES - GENERAL: PAINLEVEL: NO PAIN (0)

## 2022-09-30 NOTE — PROGRESS NOTES
Assessment/Plan:     Routine physical examination  Routine healthcare maintenance.  Preventative cares reviewed.  Annual physical exams to continue.    Type 2 diabetes mellitus without complication, without long-term current use of insulin (H)  Type 2 diabetes does show improvement with A1c decreasing from 7.7% down to 7.3% today and will continue glipizide metformin 5/500 using 2 tablets twice daily.  Pioglitazone 30 mg daily as well.  Reassess at follow-up in 4 months.  Diabetic eye exam without retinopathy September 26, 2022.  Monofilament testing today was normal.  Microalbumin screen to be updated  Prior testing was normal.  Is on angiotensin receptor blocker with losartan 100 mg daily already.  - Hemoglobin A1c  - Albumin Random Urine Quantitative with Creat Ratio  - Comprehensive metabolic panel  - Comprehensive metabolic panel    Essential hypertension  Continuing losartan 100 mg daily.  - Comprehensive metabolic panel  - Comprehensive metabolic panel    Hyperlipidemia, unspecified hyperlipidemia type  Remains on atorvastatin 20 mg daily.  - Lipid panel reflex to direct LDL Fasting  - Lipid panel reflex to direct LDL Fasting    Class 1 obesity due to excess calories with serious comorbidity and body mass index (BMI) of 33.0 to 33.9 in adult  Dietary and exercise modification for weight goal less than 230 pounds initially, less than 220 pounds ideally with BMI 33.21.    Encounter for immunization  Pneumococcal 28, Flublok and Covid-19 bivalent Pfizer boosters provided today.  - Pneumococcal 20 Valent Conjugate (Prevnar 20)  - INFLUENZA QUAD, PF (RIV4) (FLUBLOK)  - COVID-19,PF,PFIZER BOOSTER BIVALENT (12+YRS)    Gastroesophageal reflux disease without esophagitis  Utilizing omeprazole 20 mg every other day without significant breakthrough symptoms.    JAMAR on CPAP  CPAP for JAMAR management.  Will speak with the sleep clinic regarding mask replacement.    Abnormal LFTs  History of mild LFT elevation likely  "component of hepatic steatosis as well as statin utilization.  Will ensure stable findings.  Alcohol moderation being utilized as well.  - Comprehensive metabolic panel  - Comprehensive metabolic panel       The following high BMI interventions were performed this visit: encouragement to exercise, weight monitoring, weight loss from baseline weight and lifestyle education regarding diet.  Ensure ongoing efforts to achieve weight goal < 230 pounds initially, < 220 pounds ideally.           Subjective:     Tyrese Pineda is a 57 year old male who presents for an annual exam.  In general doing well.  Type 2 diabetes reviewed utilizing glipizide metformin 5/500 using 2 tablets twice daily.  Is on pioglitazone 30 mg daily that he gets filled through [facility] due to less expensive and 90-day supply available.  Continues losartan 100 mg daily for hypertension management and atorvastatin 20 mg daily for lipid management.  Has cut back on alcohol and does not drink a few days a week as well otherwise 1 or 2 Captain Cokes perhaps more on weekend if celebrating.  Diazepam as needed for low back pain but currently infrequent.  He had recent diabetic eye exam September 26, 2022.  Had colonoscopy August 2019 told to repeat at 5-year interval.  CPAP for JAMAR management.  History of mild LFT elevation likely secondary to hepatic steatosis and statin use as well.  Comprehensive review of systems as above otherwise all negative.       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 lupus   Sister and nephew with Celiac disease   No smoke   EtOH: [employer] Coke \"1-2/night\" occ 4 on weekend if party, etc.   Surgeries: 3/00 - bilateral Lasik eye surgery; bilateral inguinal hernia repair 5/2014 (Dr. Edward Merino); bilateral knee arthroscopy and meniscectomy described   12/9/05 - [facility] (pneumonia)   Pneumovax 12/05   Tdap 6/25/08 "   [employer]      Healthy Habits:     Getting at least 3 servings of Calcium per day:  Yes    Bi-annual eye exam:  Yes    Dental care twice a year:  Yes    Sleep apnea or symptoms of sleep apnea:  Sleep apnea    Diet:  Regular (no restrictions)    Frequency of exercise:  2-3 days/week    Duration of exercise:  Less than 15 minutes    Taking medications regularly:  Yes    Medication side effects:  None    PHQ-2 Total Score: 1    Additional concerns today:  No       Immunization History   Administered Date(s) Administered     COVID-19,PF,Pfizer (12+ Yrs) 03/17/2021, 04/07/2021, 12/06/2021     COVID-19,PF,Pfizer 12+ YRS BIVALENT Booster 09/30/2022     DT (PEDS <7y) 01/01/1993, 04/02/2004     Flu, Unspecified 10/01/2015, 10/11/2016     HepA, Unspecified 05/19/2010, 05/04/2011     HepA-Adult 05/19/2010, 05/04/2011     Influenza (IIV3) PF 09/17/2010, 09/26/2012     Influenza Quad, Recombinant, pf(RIV4) (Flublok) 12/14/2018, 12/31/2019, 09/17/2020, 09/21/2021, 09/30/2022     Influenza Vaccine IM > 6 months Valent IIV4 (Alfuria,Fluzone) 12/18/2013, 12/18/2013, 11/05/2014, 11/05/2014     Influenza Vaccine, 6+MO IM (QUADRIVALENT W/PRESERVATIVES) 09/17/2010, 09/26/2012, 10/01/2015, 10/11/2016, 11/21/2017     Pneumococcal 20 valent Conjugate (Prevnar 20) 09/30/2022     Pneumococcal 23 valent 08/15/2014     Td (Adult), Adsorbed 01/01/1993, 04/02/2004     Td,adult,historic,unspecified 01/01/1993, 04/02/2004     Tdap (Adacel,Boostrix) 06/25/2008, 09/07/2011, 05/27/2022     Zoster vaccine recombinant adjuvanted (SHINGRIX) 08/21/2018, 12/14/2018     Immunization status: Patient elects Flublok, pneumococcal 20 and Covid-19 bivalent booster today.      Answers for HPI/ROS submitted by the patient on 9/30/2022  abdominal pain: No  Blood in stool: No  Blood in urine: No  chest pain: No  chills: No  congestion: No  constipation: Yes  cough: Yes  diarrhea: No  dizziness: No  ear pain: No  eye pain: No  nervous/anxious:  No  fever: No  frequency: No  genital sores: No  headaches: No  hearing loss: No  heartburn: Yes  arthralgias: Yes  joint swelling: No  peripheral edema: No  mood changes: No  myalgias: No  nausea: No  dysuria: No  palpitations: No  Skin sensation changes: No  sore throat: No  urgency: No  rash: Yes  shortness of breath: Yes  visual disturbance: No  weakness: No  impotence: No  penile discharge: No          Current Outpatient Medications   Medication Sig Dispense Refill     aspirin 81 MG EC tablet [ASPIRIN 81 MG EC TABLET] Take 81 mg by mouth daily.       atorvastatin (LIPITOR) 20 MG tablet TAKE 1 TABLET BY MOUTH EVERY DAY 30 tablet 11     clindamycin (CLINDAMAX) 1 % external gel APPLY TO AFFECTED AREA TWICE A DAY       desoximetasone (TOPICORT LP) 0.05 % external cream [DESOXIMETASONE (TOPICORT) 0.05 % CREAM] APPLY SPARINGLY TO AFFECTED AREA TWICE A DAY 60 g 5     diazepam (VALIUM) 10 MG tablet Take 0.5-1 tablets (5-10 mg) by mouth every 8 hours as needed for muscle spasms 60 tablet 2     glipiZIDE-metFORMIN (METAGLIP) 5-500 MG tablet TAKE 2 TABLETS BY MOUTH 2 TIMES A DAY BEFORE MEALS. 360 tablet 3     losartan (COZAAR) 100 MG tablet [LOSARTAN (COZAAR) 100 MG TABLET] TAKE 1 TABLET BY MOUTH EVERY DAY 90 tablet 3     omeprazole (PRILOSEC) 20 MG capsule [OMEPRAZOLE (PRILOSEC) 20 MG CAPSULE] Take 20 mg by mouth daily.       pioglitazone (ACTOS) 30 MG tablet Take 1 tablet (30 mg) by mouth daily 90 tablet 3     Past Medical History:   Diagnosis Date     Diabetes mellitus (H)      Hypertension      Past Surgical History:   Procedure Laterality Date     HERNIA REPAIR Bilateral May, 2014    Dr. Edward ASCENCIO Bilateral      Lisinopril  Family History   Problem Relation Age of Onset     Celiac Disease Mother      Heart Disease Father      Social History     Socioeconomic History     Marital status:      Spouse name: Not on file     Number of children: Not on file     Years of education: Not on file     Highest  "education level: Not on file   Occupational History     Not on file   Tobacco Use     Smoking status: Former Smoker     Smokeless tobacco: Never Used   Substance and Sexual Activity     Alcohol use: Yes     Drug use: No     Sexual activity: Not on file   Other Topics Concern     Not on file   Social History Narrative     Not on file     Social Determinants of Health     Financial Resource Strain: Not on file   Food Insecurity: Not on file   Transportation Needs: Not on file   Physical Activity: Not on file   Stress: Not on file   Social Connections: Not on file   Intimate Partner Violence: Not on file   Housing Stability: Not on file       Review of Systems  Comprehensive ROS: as above, otherwise all negative.           Objective:     /82 (BP Location: Right arm, Patient Position: Sitting, Cuff Size: Adult Regular)   Pulse 96   Temp 98  F (36.7  C) (Oral)   Ht 1.808 m (5' 11.18\")   Wt 108.5 kg (239 lb 4.8 oz)   SpO2 96%   BMI 33.21 kg/m    Body mass index is 33.21 kg/m .    Physical    General Appearance:    Alert, cooperative, no distress, appears stated age.  BMI 33.21.   Head:    Normocephalic, without obvious abnormality, atraumatic   Eyes:    PERRL, conjunctiva/corneas clear, EOM's intact, fundi     benign, both eyes.  Glasses.        Ears:    Normal TM's and external ear canals, both ears   Nose:   Nares normal, septum midline, mucosa normal, no drainage    or sinus tenderness   Throat:   Lips, mucosa, and tongue normal; teeth and gums normal   Neck:   Supple, symmetrical, trachea midline, no adenopathy;        thyroid:  No enlargement/tenderness/nodules; no carotid    bruit or JVD   Back:     Symmetric, no curvature, ROM normal, no CVA tenderness   Lungs:     Clear to auscultation bilaterally, respirations unlabored   Chest wall:    No tenderness or deformity   Heart:    Regular rate and rhythm, S1 and S2 normal, no murmur, rub   or gallop   Abdomen:     Soft, non-tender, bowel sounds active all " four quadrants,     no masses, no organomegaly.     Genitalia:    Normal male without lesion, discharge or tenderness.  No inguinal hernia noted.     Rectal:    Normal tone.  Prostate normal/symmetric, no masses or tenderness.   Extremities:   Extremities normal, atraumatic, no cyanosis or edema   Pulses:   2+ and symmetric all extremities   Skin:   Skin color, texture, turgor normal, no rashes or lesions   Lymph nodes:   Cervical, supraclavicular, and axillary nodes normal   Neurologic:   CNII-XII intact. Normal strength, sensation and reflexes       throughout                This note has been dictated using voice recognition software and as a result may contain minor grammatical errors and unintended word substitutions.

## 2022-12-31 DIAGNOSIS — I10 ESSENTIAL HYPERTENSION: ICD-10-CM

## 2022-12-31 RX ORDER — LOSARTAN POTASSIUM 100 MG/1
TABLET ORAL
Qty: 90 TABLET | Refills: 2 | Status: SHIPPED | OUTPATIENT
Start: 2022-12-31 | End: 2023-01-31

## 2023-01-01 NOTE — TELEPHONE ENCOUNTER
"Last Written Prescription Date: 1/21/2022  Last Fill Quantity: 90,  # refills: 3   Last office visit provider:  9/30/2022 with PCP Dr JOY Madrid      Requested Prescriptions   Pending Prescriptions Disp Refills     losartan (COZAAR) 100 MG tablet [Pharmacy Med Name: LOSARTAN POTASSIUM 100 MG TAB] 30 tablet 11     Sig: TAKE 1 TABLET BY MOUTH EVERY DAY       Angiotensin-II Receptors Passed - 12/31/2022  1:17 AM        Passed - Last blood pressure under 140/90 in past 12 months     BP Readings from Last 3 Encounters:   09/30/22 126/82   05/27/22 120/80   01/21/22 128/78                 Passed - Recent (12 mo) or future (30 days) visit within the authorizing provider's specialty     Patient has had an office visit with the authorizing provider or a provider within the authorizing providers department within the previous 12 mos or has a future within next 30 days. See \"Patient Info\" tab in inbasket, or \"Choose Columns\" in Meds & Orders section of the refill encounter.              Passed - Medication is active on med list        Passed - Patient is age 18 or older        Passed - Normal serum creatinine on file in past 12 months     Recent Labs   Lab Test 09/30/22  0837   CR 1.07       Ok to refill medication if creatinine is low          Passed - Normal serum potassium on file in past 12 months     Recent Labs   Lab Test 09/30/22  0837   POTASSIUM 4.7                         Mounika Dutton RN 12/31/22 11:23 PM  "

## 2023-01-26 DIAGNOSIS — E11.9 TYPE 2 DIABETES MELLITUS WITHOUT COMPLICATION, WITHOUT LONG-TERM CURRENT USE OF INSULIN (H): ICD-10-CM

## 2023-01-27 RX ORDER — PIOGLITAZONEHYDROCHLORIDE 30 MG/1
TABLET ORAL
Qty: 90 TABLET | Refills: 0 | Status: SHIPPED | OUTPATIENT
Start: 2023-01-27 | End: 2023-01-31

## 2023-01-27 NOTE — TELEPHONE ENCOUNTER
"Last Written Prescription Date:  2/8/22  Last Fill Quantity: 90,  # refills: 3   Last office visit provider:  9/30/22     Requested Prescriptions   Pending Prescriptions Disp Refills     pioglitazone (ACTOS) 30 MG tablet [Pharmacy Med Name: PIOGLITAZONE HCL 30MG TABS] 90 tablet 3     Sig: TAKE ONE TABLET BY MOUTH EVERY DAY       Thiazolidinedione Agents (TZDs)  Passed - 1/26/2023  5:25 PM        Passed - Patient has a normal ALT within the past 12 mos.     Recent Labs   Lab Test 09/30/22  0837   ALT 36             Passed - Patient has a normal AST within the past 12 mos.      Recent Labs   Lab Test 09/30/22  0837   AST 32             Passed - Patient has documented A1c within the specified period of time.     If HgbA1C is 8 or greater, it needs to be on file within the past 3 months.  If less than 8, must be on file within the past 6 months.     Recent Labs   Lab Test 09/30/22  0837   A1C 7.3*             Passed - Diagnosis not CHF        Passed - Medication is active on med list        Passed - Patient is age 18 or older        Passed - Patient has a normal serum Creatinine in the past 12 months     Recent Labs   Lab Test 09/30/22  0837   CR 1.07       Ok to refill medication if creatinine is low          Passed - Recent (6 mo) or future (30 days) visit within the authorizing provider's specialty     Patient had office visit in the last 6 months or has a visit in the next 30 days with authorizing provider or within the authorizing provider's specialty.  See \"Patient Info\" tab in inbasket, or \"Choose Columns\" in Meds & Orders section of the refill encounter.                 Yu Britton RN 01/27/23 4:03 PM  "

## 2023-01-31 ENCOUNTER — OFFICE VISIT (OUTPATIENT)
Dept: FAMILY MEDICINE | Facility: CLINIC | Age: 58
End: 2023-01-31
Payer: COMMERCIAL

## 2023-01-31 VITALS
WEIGHT: 245 LBS | DIASTOLIC BLOOD PRESSURE: 82 MMHG | HEIGHT: 71 IN | SYSTOLIC BLOOD PRESSURE: 136 MMHG | HEART RATE: 100 BPM | OXYGEN SATURATION: 96 % | BODY MASS INDEX: 34.3 KG/M2

## 2023-01-31 DIAGNOSIS — E11.9 TYPE 2 DIABETES MELLITUS WITHOUT COMPLICATION, WITHOUT LONG-TERM CURRENT USE OF INSULIN (H): Primary | ICD-10-CM

## 2023-01-31 DIAGNOSIS — E78.5 HYPERLIPIDEMIA, UNSPECIFIED HYPERLIPIDEMIA TYPE: ICD-10-CM

## 2023-01-31 DIAGNOSIS — I10 ESSENTIAL HYPERTENSION: ICD-10-CM

## 2023-01-31 DIAGNOSIS — E66.811 CLASS 1 OBESITY DUE TO EXCESS CALORIES WITH SERIOUS COMORBIDITY AND BODY MASS INDEX (BMI) OF 34.0 TO 34.9 IN ADULT: ICD-10-CM

## 2023-01-31 DIAGNOSIS — E66.09 CLASS 1 OBESITY DUE TO EXCESS CALORIES WITH SERIOUS COMORBIDITY AND BODY MASS INDEX (BMI) OF 34.0 TO 34.9 IN ADULT: ICD-10-CM

## 2023-01-31 DIAGNOSIS — R20.2 PARESTHESIA OF FOOT, BILATERAL: ICD-10-CM

## 2023-01-31 LAB
ANION GAP SERPL CALCULATED.3IONS-SCNC: 14 MMOL/L (ref 7–15)
BUN SERPL-MCNC: 17.1 MG/DL (ref 6–20)
CALCIUM SERPL-MCNC: 9.7 MG/DL (ref 8.6–10)
CHLORIDE SERPL-SCNC: 101 MMOL/L (ref 98–107)
CREAT SERPL-MCNC: 0.93 MG/DL (ref 0.67–1.17)
DEPRECATED HCO3 PLAS-SCNC: 21 MMOL/L (ref 22–29)
GFR SERPL CREATININE-BSD FRML MDRD: >90 ML/MIN/1.73M2
GLUCOSE SERPL-MCNC: 149 MG/DL (ref 70–99)
HBA1C MFR BLD: 8 % (ref 0–5.6)
HOLD SPECIMEN: NORMAL
POTASSIUM SERPL-SCNC: 4.2 MMOL/L (ref 3.4–5.3)
SODIUM SERPL-SCNC: 136 MMOL/L (ref 136–145)

## 2023-01-31 PROCEDURE — 36415 COLL VENOUS BLD VENIPUNCTURE: CPT | Performed by: FAMILY MEDICINE

## 2023-01-31 PROCEDURE — 83036 HEMOGLOBIN GLYCOSYLATED A1C: CPT | Performed by: FAMILY MEDICINE

## 2023-01-31 PROCEDURE — 80048 BASIC METABOLIC PNL TOTAL CA: CPT | Performed by: FAMILY MEDICINE

## 2023-01-31 PROCEDURE — 99214 OFFICE O/P EST MOD 30 MIN: CPT | Performed by: FAMILY MEDICINE

## 2023-01-31 RX ORDER — GLIPIZIDE AND METFORMIN HCL 5; 500 MG/1; MG/1
TABLET, FILM COATED ORAL
Qty: 360 TABLET | Refills: 3 | Status: SHIPPED | OUTPATIENT
Start: 2023-01-31 | End: 2024-02-01

## 2023-01-31 RX ORDER — PIOGLITAZONEHYDROCHLORIDE 30 MG/1
30 TABLET ORAL DAILY
Qty: 90 TABLET | Refills: 3 | Status: SHIPPED | OUTPATIENT
Start: 2023-01-31 | End: 2024-01-30

## 2023-01-31 RX ORDER — ATORVASTATIN CALCIUM 20 MG/1
20 TABLET, FILM COATED ORAL DAILY
Qty: 90 TABLET | Refills: 3 | Status: SHIPPED | OUTPATIENT
Start: 2023-01-31 | End: 2023-10-06

## 2023-01-31 RX ORDER — LOSARTAN POTASSIUM 100 MG/1
TABLET ORAL
Qty: 90 TABLET | Refills: 3 | Status: SHIPPED | OUTPATIENT
Start: 2023-01-31 | End: 2023-10-06

## 2023-01-31 ASSESSMENT — PATIENT HEALTH QUESTIONNAIRE - PHQ9
SUM OF ALL RESPONSES TO PHQ QUESTIONS 1-9: 5
SUM OF ALL RESPONSES TO PHQ QUESTIONS 1-9: 5
10. IF YOU CHECKED OFF ANY PROBLEMS, HOW DIFFICULT HAVE THESE PROBLEMS MADE IT FOR YOU TO DO YOUR WORK, TAKE CARE OF THINGS AT HOME, OR GET ALONG WITH OTHER PEOPLE: NOT DIFFICULT AT ALL
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
## 2023-04-26 DIAGNOSIS — E11.9 TYPE 2 DIABETES MELLITUS WITHOUT COMPLICATION, WITHOUT LONG-TERM CURRENT USE OF INSULIN (H): ICD-10-CM

## 2023-04-26 RX ORDER — PIOGLITAZONEHYDROCHLORIDE 30 MG/1
TABLET ORAL
Qty: 90 TABLET | Refills: 0 | OUTPATIENT
Start: 2023-04-26

## 2023-05-30 ENCOUNTER — OFFICE VISIT (OUTPATIENT)
Dept: FAMILY MEDICINE | Facility: CLINIC | Age: 58
End: 2023-05-30
Payer: COMMERCIAL

## 2023-05-30 VITALS
HEART RATE: 79 BPM | WEIGHT: 249.7 LBS | HEIGHT: 71 IN | DIASTOLIC BLOOD PRESSURE: 76 MMHG | SYSTOLIC BLOOD PRESSURE: 128 MMHG | BODY MASS INDEX: 34.96 KG/M2 | OXYGEN SATURATION: 94 % | TEMPERATURE: 98.5 F | RESPIRATION RATE: 14 BRPM

## 2023-05-30 DIAGNOSIS — E66.09 CLASS 1 OBESITY DUE TO EXCESS CALORIES WITH SERIOUS COMORBIDITY AND BODY MASS INDEX (BMI) OF 34.0 TO 34.9 IN ADULT: ICD-10-CM

## 2023-05-30 DIAGNOSIS — E78.5 HYPERLIPIDEMIA, UNSPECIFIED HYPERLIPIDEMIA TYPE: ICD-10-CM

## 2023-05-30 DIAGNOSIS — E11.9 TYPE 2 DIABETES MELLITUS WITHOUT COMPLICATION, WITHOUT LONG-TERM CURRENT USE OF INSULIN (H): Primary | ICD-10-CM

## 2023-05-30 DIAGNOSIS — I10 ESSENTIAL HYPERTENSION: ICD-10-CM

## 2023-05-30 DIAGNOSIS — E66.811 CLASS 1 OBESITY DUE TO EXCESS CALORIES WITH SERIOUS COMORBIDITY AND BODY MASS INDEX (BMI) OF 34.0 TO 34.9 IN ADULT: ICD-10-CM

## 2023-05-30 LAB
ANION GAP SERPL CALCULATED.3IONS-SCNC: 12 MMOL/L (ref 7–15)
BUN SERPL-MCNC: 13.5 MG/DL (ref 8–23)
CALCIUM SERPL-MCNC: 9.6 MG/DL (ref 8.6–10)
CHLORIDE SERPL-SCNC: 102 MMOL/L (ref 98–107)
CREAT SERPL-MCNC: 0.98 MG/DL (ref 0.67–1.17)
DEPRECATED HCO3 PLAS-SCNC: 24 MMOL/L (ref 22–29)
GFR SERPL CREATININE-BSD FRML MDRD: 89 ML/MIN/1.73M2
GLUCOSE SERPL-MCNC: 143 MG/DL (ref 70–99)
HBA1C MFR BLD: 8 % (ref 0–5.6)
HOLD SPECIMEN: NORMAL
POTASSIUM SERPL-SCNC: 4.8 MMOL/L (ref 3.4–5.3)
SODIUM SERPL-SCNC: 138 MMOL/L (ref 136–145)

## 2023-05-30 PROCEDURE — 99214 OFFICE O/P EST MOD 30 MIN: CPT | Performed by: FAMILY MEDICINE

## 2023-05-30 PROCEDURE — 36415 COLL VENOUS BLD VENIPUNCTURE: CPT | Performed by: FAMILY MEDICINE

## 2023-05-30 PROCEDURE — 80048 BASIC METABOLIC PNL TOTAL CA: CPT | Performed by: FAMILY MEDICINE

## 2023-05-30 PROCEDURE — 83036 HEMOGLOBIN GLYCOSYLATED A1C: CPT | Performed by: FAMILY MEDICINE

## 2023-05-30 ASSESSMENT — PAIN SCALES - GENERAL: PAINLEVEL: NO PAIN (0)

## 2023-05-30 NOTE — PROGRESS NOTES
"Assessment/Plan:    Type 2 diabetes mellitus without complication, without long-term current use of insulin (H)  Type 2 diabetes reviewed.  Stable at 8%.  5 additional pound weight gain since prior office visit in which she had 6 pound weight gain from the visit prior to that.  Understands need to improve dietary and exercise modifications for weight goal less than 230 pounds initially, less than 220 pounds ideally.  Medication monitoring while on glipizide metformin 5/500 using 2 tablets twice daily and pioglitazone 30 mg daily.  - Basic metabolic panel    Essential hypertension  Utilizing losartan 100 mg daily.  Prior microalbumin screen normal.  - Basic metabolic panel    Hyperlipidemia, unspecified hyperlipidemia type  Atorvastatin 20 mg daily.    Class 1 obesity due to excess calories with serious comorbidity and body mass index (BMI) of 34.0 to 34.9 in adult  Dietary and exercise modifications reviewed for weight goal less than 230 pounds initially, less than 220 pounds ideally    Anticipate physical exam in early October 2023.      Subjective:    Tyrese HAMILTON Edwin is seen today for follow-up assessment.  Type 2 diabetes.  Glipizide metformin 5/500 using 2 tablets twice daily and pioglitazone 30 mg daily.  No hypoglycemic episodes.  Prior A1c having increased slightly from 7.3% up to 8% after 6 pound weight gain at time of last visit January 31, 2023.  5 additional pound weight gain noted since this time with dietary indiscretions over Memorial Day weekend etc.  Remains on losartan 100 mg daily and atorvastatin 20 mg daily for lipid management.  Comprehensive review of systems as above otherwise all negative.  Denies recent illness.       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 lupus   Sister and nephew with Celiac disease   No smoke   EtOH: [employer] \"1-2/night\" occ 4 on weekend if party, etc. "   Surgeries: 3/00 - bilateral Lasik eye surgery; bilateral inguinal hernia repair 5/2014 (Dr. Edward Merino); bilateral knee arthroscopy and meniscectomy described   12/9/05 - [place] (pneumonia)   Pneumovax 12/05   Tdap 6/25/08   [employer]        Past Surgical History:   Procedure Laterality Date     HERNIA REPAIR Bilateral May, 2014    Dr. Edward Merino     LASJOON Bilateral         Family History   Problem Relation Age of Onset     Celiac Disease Mother      Heart Disease Father         Past Medical History:   Diagnosis Date     Diabetes mellitus (H)      Hypertension         Social History     Tobacco Use     Smoking status: Former     Smokeless tobacco: Never   Substance Use Topics     Alcohol use: Yes     Drug use: No        Current Outpatient Medications   Medication Sig Dispense Refill     aspirin 81 MG EC tablet [ASPIRIN 81 MG EC TABLET] Take 81 mg by mouth daily.       atorvastatin (LIPITOR) 20 MG tablet Take 1 tablet (20 mg) by mouth daily 90 tablet 3     clindamycin (CLINDAMAX) 1 % external gel APPLY TO AFFECTED AREA TWICE A DAY       desoximetasone (TOPICORT LP) 0.05 % external cream [DESOXIMETASONE (TOPICORT) 0.05 % CREAM] APPLY SPARINGLY TO AFFECTED AREA TWICE A DAY 60 g 5     diazepam (VALIUM) 10 MG tablet Take 0.5-1 tablets (5-10 mg) by mouth every 8 hours as needed for muscle spasms 60 tablet 2     glipiZIDE-metFORMIN (METAGLIP) 5-500 MG tablet TAKE 2 TABLETS BY MOUTH 2 TIMES A DAY BEFORE MEALS.  Strength: 5-500 mg 360 tablet 3     losartan (COZAAR) 100 MG tablet TAKE 1 TABLET BY MOUTH EVERY DAY 90 tablet 3     omeprazole (PRILOSEC) 20 MG capsule [OMEPRAZOLE (PRILOSEC) 20 MG CAPSULE] Take 20 mg by mouth daily.       pioglitazone (ACTOS) 30 MG tablet Take 1 tablet (30 mg) by mouth daily 90 tablet 3          Objective:    Vitals:    05/30/23 0826 05/30/23 0838   BP: 130/84 128/76   BP Location: Left arm    Patient Position: Sitting    Cuff Size: Adult Large    Pulse: 79    Resp: 14    Temp:  "98.5  F (36.9  C)    TempSrc: Temporal    SpO2: 94%    Weight: 113.3 kg (249 lb 11.2 oz)    Height: 1.807 m (5' 11.14\")       Body mass index is 34.69 kg/m .    Alert.  No apparent distress.  Chest clear.  Cardiac exam regular.  Extremities warm and dry.      This note has been dictated using voice recognition software and as a result may contain minor grammatical errors and unintended word substitutions.   Answers for HPI/ROS submitted by the patient on 5/23/2023  Do you check your blood pressure regularly outside of the clinic?: No  Are your blood pressures ever more than 140 on the top number (systolic) OR more than 90 on the bottom number (diastolic)? (For example, greater than 140/90): No  Are you following a low salt diet?: No  Frequency of checking blood sugars:: not at all  Diabetic concerns:: none  Paraesthesia present:: burning in feet, weight gain  How many servings of fruits and vegetables do you eat daily?: 0-1  On average, how many sweetened beverages do you drink each day (Examples: soda, juice, sweet tea, etc.  Do NOT count diet or artificially sweetened beverages)?: 2  How many minutes a day do you exercise enough to make your heart beat faster?: 9 or less  How many days a week do you exercise enough to make your heart beat faster?: 3 or less  How many days per week do you miss taking your medication?: 0      "

## 2023-06-16 ENCOUNTER — APPOINTMENT (OUTPATIENT)
Dept: CT IMAGING | Facility: HOSPITAL | Age: 58
End: 2023-06-16
Attending: EMERGENCY MEDICINE
Payer: COMMERCIAL

## 2023-06-16 ENCOUNTER — HOSPITAL ENCOUNTER (EMERGENCY)
Facility: HOSPITAL | Age: 58
Discharge: HOME | End: 2023-06-16
Attending: EMERGENCY MEDICINE | Admitting: EMERGENCY MEDICINE
Payer: COMMERCIAL

## 2023-06-16 ENCOUNTER — APPOINTMENT (OUTPATIENT)
Dept: RADIOLOGY | Facility: HOSPITAL | Age: 58
End: 2023-06-16
Attending: EMERGENCY MEDICINE
Payer: COMMERCIAL

## 2023-06-16 VITALS
HEART RATE: 98 BPM | BODY MASS INDEX: 34.58 KG/M2 | TEMPERATURE: 98.2 F | WEIGHT: 247 LBS | HEIGHT: 71 IN | RESPIRATION RATE: 16 BRPM | OXYGEN SATURATION: 95 % | DIASTOLIC BLOOD PRESSURE: 91 MMHG | SYSTOLIC BLOOD PRESSURE: 141 MMHG

## 2023-06-16 DIAGNOSIS — S40.022A CONTUSION OF MULTIPLE SITES OF LEFT UPPER EXTREMITY, INITIAL ENCOUNTER: ICD-10-CM
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
## 2023-06-16 DIAGNOSIS — S51.812A FOREARM LACERATION, LEFT, INITIAL ENCOUNTER: ICD-10-CM

## 2023-06-16 DIAGNOSIS — S00.81XA ABRASION OF FACE, INITIAL ENCOUNTER: ICD-10-CM

## 2023-06-16 DIAGNOSIS — S09.90XA INJURY OF HEAD, INITIAL ENCOUNTER: ICD-10-CM

## 2023-06-16 PROCEDURE — 99285 EMERGENCY DEPT VISIT HI MDM: CPT | Mod: 25

## 2023-06-16 PROCEDURE — 72125 CT NECK SPINE W/O DYE: CPT

## 2023-06-16 PROCEDURE — 250N000013 HC RX MED GY IP 250 OP 250 PS 637: Performed by: EMERGENCY MEDICINE

## 2023-06-16 PROCEDURE — 73080 X-RAY EXAM OF ELBOW: CPT | Mod: LT

## 2023-06-16 PROCEDURE — 96374 THER/PROPH/DIAG INJ IV PUSH: CPT | Mod: 59

## 2023-06-16 PROCEDURE — 73110 X-RAY EXAM OF WRIST: CPT | Mod: LT

## 2023-06-16 PROCEDURE — 70486 CT MAXILLOFACIAL W/O DYE: CPT

## 2023-06-16 PROCEDURE — 73590 X-RAY EXAM OF LOWER LEG: CPT | Mod: 50

## 2023-06-16 PROCEDURE — 12002 RPR S/N/AX/GEN/TRNK2.6-7.5CM: CPT

## 2023-06-16 PROCEDURE — 70450 CT HEAD/BRAIN W/O DYE: CPT

## 2023-06-16 PROCEDURE — 250N000009 HC RX 250: Performed by: EMERGENCY MEDICINE

## 2023-06-16 PROCEDURE — 73090 X-RAY EXAM OF FOREARM: CPT | Mod: LT

## 2023-06-16 PROCEDURE — 250N000011 HC RX IP 250 OP 636: Performed by: EMERGENCY MEDICINE

## 2023-06-16 PROCEDURE — 73030 X-RAY EXAM OF SHOULDER: CPT | Mod: LT

## 2023-06-16 RX ORDER — GINSENG 100 MG
CAPSULE ORAL ONCE
Status: COMPLETED | OUTPATIENT
Start: 2023-06-16 | End: 2023-06-16

## 2023-06-16 RX ORDER — BACITRACIN ZINC 500 [USP'U]/G
OINTMENT TOPICAL 2 TIMES DAILY
Qty: 28 G | Refills: 0 | Status: SHIPPED | OUTPATIENT
Start: 2023-06-16 | End: 2023-06-23

## 2023-06-16 RX ORDER — MORPHINE SULFATE 4 MG/ML
4 INJECTION, SOLUTION INTRAMUSCULAR; INTRAVENOUS ONCE
Status: COMPLETED | OUTPATIENT
Start: 2023-06-16 | End: 2023-06-16

## 2023-06-16 RX ORDER — SILVER SULFADIAZINE 10 MG/G
CREAM TOPICAL DAILY
Qty: 50 G | Refills: 0 | Status: SHIPPED | OUTPATIENT
Start: 2023-06-16 | End: 2023-06-26

## 2023-06-16 RX ORDER — OXYCODONE HYDROCHLORIDE 5 MG/1
10 TABLET ORAL ONCE
Status: COMPLETED | OUTPATIENT
Start: 2023-06-16 | End: 2023-06-16

## 2023-06-16 RX ORDER — OXYCODONE HYDROCHLORIDE 5 MG/1
5 TABLET ORAL EVERY 6 HOURS PRN
Qty: 12 TABLET | Refills: 0 | Status: SHIPPED | OUTPATIENT
Start: 2023-06-16 | End: 2023-06-19

## 2023-06-16 RX ADMIN — OXYCODONE HYDROCHLORIDE 10 MG: 5 TABLET ORAL at 21:14

## 2023-06-16 RX ADMIN — MORPHINE SULFATE 4 MG: 4 INJECTION, SOLUTION INTRAMUSCULAR; INTRAVENOUS at 18:58

## 2023-06-16 RX ADMIN — BACITRACIN: 500 OINTMENT TOPICAL at 23:03

## 2023-06-16 RX ADMIN — Medication 3 ML: at 21:14

## 2023-06-16 ASSESSMENT — ACTIVITIES OF DAILY LIVING (ADL)
ADLS_ACUITY_SCORE: 35
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
## 2023-06-17 NOTE — DISCHARGE INSTRUCTIONS
Take ibuprofen 400 mg every 8 hours and Tylenol 650 m g every 6 hours for pain management.  Use oxycodone every 6 hours for pain not control led with itylenol and buprofen.  Apply bacitracin or Silvadene to the areas of the abrasion.  Return to the emergency department if you have headache not improved with Tylenol, vomiting change in mental status or signs of infection redness swelling or drainage at the area of the sutured wound.  Otherwise see Dr. Madrid in follow-up for suture removal in 10 days

## 2023-06-21 ENCOUNTER — NURSE TRIAGE (OUTPATIENT)
Dept: NURSING | Facility: CLINIC | Age: 58
End: 2023-06-21
Payer: COMMERCIAL
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
## 2023-06-26 ENCOUNTER — OFFICE VISIT (OUTPATIENT)
Dept: FAMILY MEDICINE | Facility: CLINIC | Age: 58
End: 2023-06-26
Payer: COMMERCIAL

## 2023-06-26 VITALS
OXYGEN SATURATION: 94 % | TEMPERATURE: 97.9 F | DIASTOLIC BLOOD PRESSURE: 78 MMHG | BODY MASS INDEX: 34.76 KG/M2 | WEIGHT: 248.3 LBS | RESPIRATION RATE: 20 BRPM | HEIGHT: 71 IN | SYSTOLIC BLOOD PRESSURE: 126 MMHG | HEART RATE: 108 BPM

## 2023-06-26 DIAGNOSIS — I10 ESSENTIAL HYPERTENSION: ICD-10-CM

## 2023-06-26 DIAGNOSIS — S51.812D LACERATION OF LEFT FOREARM, SUBSEQUENT ENCOUNTER: ICD-10-CM

## 2023-06-26 DIAGNOSIS — R42 DIZZINESS: Primary | ICD-10-CM

## 2023-06-26 DIAGNOSIS — S00.03XD CONTUSION OF SCALP, SUBSEQUENT ENCOUNTER: ICD-10-CM

## 2023-06-26 DIAGNOSIS — E11.9 TYPE 2 DIABETES MELLITUS WITHOUT COMPLICATION, WITHOUT LONG-TERM CURRENT USE OF INSULIN (H): ICD-10-CM

## 2023-06-26 PROCEDURE — 99214 OFFICE O/P EST MOD 30 MIN: CPT | Performed by: NURSE PRACTITIONER

## 2023-06-26 ASSESSMENT — PAIN SCALES - GENERAL: PAINLEVEL: EXTREME PAIN (8)
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
## 2023-06-28 ENCOUNTER — OFFICE VISIT (OUTPATIENT)
Dept: FAMILY MEDICINE | Facility: CLINIC | Age: 58
End: 2023-06-28
Payer: COMMERCIAL

## 2023-06-28 VITALS
BODY MASS INDEX: 34.3 KG/M2 | WEIGHT: 245 LBS | HEART RATE: 97 BPM | SYSTOLIC BLOOD PRESSURE: 126 MMHG | DIASTOLIC BLOOD PRESSURE: 98 MMHG | RESPIRATION RATE: 20 BRPM | OXYGEN SATURATION: 92 % | TEMPERATURE: 97.9 F

## 2023-06-28 DIAGNOSIS — M79.662 BILATERAL CALF PAIN: ICD-10-CM

## 2023-06-28 DIAGNOSIS — S51.812D LACERATION OF LEFT FOREARM, SUBSEQUENT ENCOUNTER: Primary | ICD-10-CM

## 2023-06-28 DIAGNOSIS — R20.2 PARESTHESIA OF LEFT ARM: ICD-10-CM

## 2023-06-28 DIAGNOSIS — R42 DIZZINESS: ICD-10-CM

## 2023-06-28 DIAGNOSIS — M79.661 BILATERAL CALF PAIN: ICD-10-CM

## 2023-06-28 PROCEDURE — 99214 OFFICE O/P EST MOD 30 MIN: CPT | Performed by: FAMILY MEDICINE

## 2023-06-28 RX ORDER — MECLIZINE HYDROCHLORIDE 25 MG/1
25 TABLET ORAL 3 TIMES DAILY PRN
Qty: 30 TABLET | Refills: 0 | Status: SHIPPED | OUTPATIENT
Start: 2023-06-28 | End: 2023-10-06

## 2023-06-28 ASSESSMENT — PAIN SCALES - GENERAL: PAINLEVEL: SEVERE PAIN (7)
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
## 2023-07-14 ENCOUNTER — MYC MEDICAL ADVICE (OUTPATIENT)
Dept: FAMILY MEDICINE | Facility: CLINIC | Age: 58
End: 2023-07-14
Payer: COMMERCIAL

## 2023-07-17 VITALS — SYSTOLIC BLOOD PRESSURE: 123 MMHG | DIASTOLIC BLOOD PRESSURE: 78 MMHG

## 2023-07-17 NOTE — TELEPHONE ENCOUNTER
Patient reported vitals input into patient chart.    Maria Elena Rowe, ODILONN, RN  [facility]

## 2023-08-25 ENCOUNTER — TRANSFERRED RECORDS (OUTPATIENT)
Dept: HEALTH INFORMATION MANAGEMENT | Facility: CLINIC | Age: 58
End: 2023-08-25
Payer: COMMERCIAL

## 2023-08-25 LAB — RETINOPATHY: NEGATIVE

## 2023-09-28 ENCOUNTER — TRANSFERRED RECORDS (OUTPATIENT)
Dept: HEALTH INFORMATION MANAGEMENT | Facility: CLINIC | Age: 58
End: 2023-09-28
Payer: COMMERCIAL

## 2023-10-05 ASSESSMENT — ENCOUNTER SYMPTOMS
HEMATOCHEZIA: 0
JOINT SWELLING: 0
CHILLS: 0
PARESTHESIAS: 0
ARTHRALGIAS: 1
ABDOMINAL PAIN: 0
SORE THROAT: 0
EYE PAIN: 0
COUGH: 1
PALPITATIONS: 0
FREQUENCY: 0
CONSTIPATION: 1
WEAKNESS: 0
SHORTNESS OF BREATH: 0
HEADACHES: 0
DIZZINESS: 1
NAUSEA: 0
DYSURIA: 0
NERVOUS/ANXIOUS: 0
DIARRHEA: 0
MYALGIAS: 1
FEVER: 0
HEARTBURN: 1
HEMATURIA: 0

## 2023-10-06 ENCOUNTER — OFFICE VISIT (OUTPATIENT)
Dept: FAMILY MEDICINE | Facility: CLINIC | Age: 58
End: 2023-10-06
Payer: COMMERCIAL

## 2023-10-06 VITALS
HEIGHT: 71 IN | OXYGEN SATURATION: 94 % | BODY MASS INDEX: 34.65 KG/M2 | DIASTOLIC BLOOD PRESSURE: 80 MMHG | TEMPERATURE: 98.7 F | SYSTOLIC BLOOD PRESSURE: 114 MMHG | RESPIRATION RATE: 16 BRPM | WEIGHT: 247.5 LBS | HEART RATE: 86 BPM

## 2023-10-06 DIAGNOSIS — E78.5 HYPERLIPIDEMIA, UNSPECIFIED HYPERLIPIDEMIA TYPE: ICD-10-CM

## 2023-10-06 DIAGNOSIS — Z00.00 ROUTINE PHYSICAL EXAMINATION: Primary | ICD-10-CM

## 2023-10-06 DIAGNOSIS — E66.811 CLASS 1 OBESITY DUE TO EXCESS CALORIES WITH SERIOUS COMORBIDITY AND BODY MASS INDEX (BMI) OF 34.0 TO 34.9 IN ADULT: ICD-10-CM

## 2023-10-06 DIAGNOSIS — I10 ESSENTIAL HYPERTENSION: ICD-10-CM

## 2023-10-06 DIAGNOSIS — E66.09 CLASS 1 OBESITY DUE TO EXCESS CALORIES WITH SERIOUS COMORBIDITY AND BODY MASS INDEX (BMI) OF 34.0 TO 34.9 IN ADULT: ICD-10-CM

## 2023-10-06 DIAGNOSIS — Z01.84 IMMUNITY STATUS TESTING: ICD-10-CM

## 2023-10-06 DIAGNOSIS — G47.33 OSA ON CPAP: ICD-10-CM

## 2023-10-06 DIAGNOSIS — E11.9 TYPE 2 DIABETES MELLITUS WITHOUT COMPLICATION, WITHOUT LONG-TERM CURRENT USE OF INSULIN (H): ICD-10-CM

## 2023-10-06 DIAGNOSIS — Z23 ENCOUNTER FOR IMMUNIZATION: ICD-10-CM

## 2023-10-06 DIAGNOSIS — Z12.5 SCREENING FOR PROSTATE CANCER: ICD-10-CM

## 2023-10-06 LAB
ANION GAP SERPL CALCULATED.3IONS-SCNC: 12 MMOL/L (ref 7–15)
BUN SERPL-MCNC: 15.8 MG/DL (ref 8–23)
CALCIUM SERPL-MCNC: 9.9 MG/DL (ref 8.6–10)
CHLORIDE SERPL-SCNC: 101 MMOL/L (ref 98–107)
CHOLEST SERPL-MCNC: 154 MG/DL
CREAT SERPL-MCNC: 0.89 MG/DL (ref 0.67–1.17)
CREAT UR-MCNC: 198 MG/DL
DEPRECATED HCO3 PLAS-SCNC: 24 MMOL/L (ref 22–29)
EGFRCR SERPLBLD CKD-EPI 2021: >90 ML/MIN/1.73M2
GLUCOSE SERPL-MCNC: 150 MG/DL (ref 70–99)
HBV SURFACE AB SERPL IA-ACNC: 0.5 M[IU]/ML
HBV SURFACE AB SERPL IA-ACNC: NONREACTIVE M[IU]/ML
HDLC SERPL-MCNC: 51 MG/DL
LDLC SERPL CALC-MCNC: 75 MG/DL
MICROALBUMIN UR-MCNC: <12 MG/L
MICROALBUMIN/CREAT UR: NORMAL MG/G{CREAT}
NONHDLC SERPL-MCNC: 103 MG/DL
POTASSIUM SERPL-SCNC: 4.6 MMOL/L (ref 3.4–5.3)
PSA SERPL DL<=0.01 NG/ML-MCNC: 1.13 NG/ML (ref 0–3.5)
SODIUM SERPL-SCNC: 137 MMOL/L (ref 135–145)
TRIGL SERPL-MCNC: 140 MG/DL

## 2023-10-06 PROCEDURE — G0103 PSA SCREENING: HCPCS | Performed by: FAMILY MEDICINE

## 2023-10-06 PROCEDURE — 90471 IMMUNIZATION ADMIN: CPT | Performed by: FAMILY MEDICINE

## 2023-10-06 PROCEDURE — 82570 ASSAY OF URINE CREATININE: CPT | Performed by: FAMILY MEDICINE

## 2023-10-06 PROCEDURE — 99214 OFFICE O/P EST MOD 30 MIN: CPT | Mod: 25 | Performed by: FAMILY MEDICINE

## 2023-10-06 PROCEDURE — 82043 UR ALBUMIN QUANTITATIVE: CPT | Performed by: FAMILY MEDICINE

## 2023-10-06 PROCEDURE — 90682 RIV4 VACC RECOMBINANT DNA IM: CPT | Performed by: FAMILY MEDICINE

## 2023-10-06 PROCEDURE — 80061 LIPID PANEL: CPT | Performed by: FAMILY MEDICINE

## 2023-10-06 PROCEDURE — 86706 HEP B SURFACE ANTIBODY: CPT | Performed by: FAMILY MEDICINE

## 2023-10-06 PROCEDURE — 99396 PREV VISIT EST AGE 40-64: CPT | Mod: 25 | Performed by: FAMILY MEDICINE

## 2023-10-06 PROCEDURE — 80048 BASIC METABOLIC PNL TOTAL CA: CPT | Performed by: FAMILY MEDICINE

## 2023-10-06 PROCEDURE — 36415 COLL VENOUS BLD VENIPUNCTURE: CPT | Performed by: FAMILY MEDICINE

## 2023-10-06 RX ORDER — LOSARTAN POTASSIUM 100 MG/1
TABLET ORAL
Qty: 90 TABLET | Refills: 3 | Status: SHIPPED | OUTPATIENT
Start: 2023-10-06 | End: 2024-09-18

## 2023-10-06 RX ORDER — ATORVASTATIN CALCIUM 20 MG/1
20 TABLET, FILM COATED ORAL DAILY
Qty: 90 TABLET | Refills: 3 | Status: SHIPPED | OUTPATIENT
Start: 2023-10-06 | End: 2024-09-18

## 2023-10-06 ASSESSMENT — PAIN SCALES - GENERAL: PAINLEVEL: MILD PAIN (3)
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
## 2023-10-10 ENCOUNTER — TRANSFERRED RECORDS (OUTPATIENT)
Dept: HEALTH INFORMATION MANAGEMENT | Facility: CLINIC | Age: 58
End: 2023-10-10
Payer: COMMERCIAL

## 2023-12-26 DIAGNOSIS — E11.9 TYPE 2 DIABETES MELLITUS WITHOUT COMPLICATION, WITHOUT LONG-TERM CURRENT USE OF INSULIN (H): ICD-10-CM

## 2023-12-27 ENCOUNTER — PATIENT OUTREACH (OUTPATIENT)
Dept: GASTROENTEROLOGY | Facility: CLINIC | Age: 58
End: 2023-12-27
Payer: COMMERCIAL

## 2024-01-21 ENCOUNTER — HEALTH MAINTENANCE LETTER (OUTPATIENT)
Age: 59
End: 2024-01-21

## 2024-01-23 ENCOUNTER — NURSE TRIAGE (OUTPATIENT)
Dept: FAMILY MEDICINE | Facility: CLINIC | Age: 59
End: 2024-01-23

## 2024-01-23 DIAGNOSIS — U07.1 INFECTION DUE TO 2019 NOVEL CORONAVIRUS: Primary | ICD-10-CM

## 2024-01-23 NOTE — TELEPHONE ENCOUNTER
100 temperature and sweating.  Took ibuprofen  Do not check blood sugars  COVID test -   Vomiting  Congestion/

## 2024-01-23 NOTE — TELEPHONE ENCOUNTER
Nurse Triage SBAR    Is this a 2nd Level Triage? NO    Situation:   Telephone call into the clinic from the patient to report a positive COVID-9 test today, 1/23/24, Tuesday, with COVID-19 symptoms    Background:   Hx of chronic pain syndrome, JAMAR, GERD, HTN, HLD, obesity, and BMI of 34.4, as well as former smoker    Assessment:   Patient reports a positive COVID-19 home test on 1/23/24, Tuesday    Symptoms of COVID-19 infection started on 1/22/24, Monday:  Fever, cough, fatigue, headache, sore throat, congestion, vomiting, chills, weakness    Denies chest pain, trouble breathing, increased swelling of lower extremities, dizziness or lightheadedness, weakness or numbness of either side of the body, vomiting, diarrhea, or fainting     Patient reports that he has discontinued taking the pioglitazone as listed in his med list    Protocol Recommended Disposition:   Home Care    Recommendation:   Gave care advice. Recommended that the patient follow home care advice for symptomatic treatment of COVID-19.    Patient verbalized understanding and agrees with the plan    Writer also sent a full prescription of Paxlovid to the [facility] in [place] per patient request.    Writer also sent a question to the  regarding if it is acceptable for the patient to go to an in-clinic appointment next week, after 5 days of isolation-writer will contact patient via phone call with the manager's answer    Denies other questions or concerns at this time.    Home Care and Paxlovid treatment    Does the patient meet one of the following criteria for ADS visit consideration? 16+ years old, with an [facility] PCP     TIP  Providers, please consider if this condition is appropriate for management at one of our Acute and Diagnostic Services sites.     If patient is a good candidate, please use dotphrase <dot>triageresponse and select Refer to ADS to document.     RN COVID TREATMENT VISIT  01/23/24      The patient has been triaged  and does not require a higher level of care.    Tyrese HAMILTON Ettl  58 year old  Current weight? 235#    Has the patient been seen by a primary care provider at an [facility] or [facility] within the past two years? Yes.   Have you been in close proximity to/do you have a known exposure to a person with a confirmed case of influenza? No.     General treatment eligibility:  Date of positive COVID test (PCR or at home)?  1/23/24, Tuesday    Are you or have you been hospitalized for this COVID-19 infection? No.   Have you received monoclonal antibodies or antiviral treatment for COVID-19 since this positive test? No.   Do you have any of the following conditions that place you at risk of being very sick from COVID-19?   - Age 50 years or older  - Heart conditions such as cardiomyopathies, congenital heart defects, coronary artery disease, heart arrhythmias, heart failure, hypertension, valve disorders   - Overweight or Obesity (BMI >85th percentile or BMI 25 or higher)-32.8 BMI 1/23/24  - Current or former smoker  Yes, patient has at least one high risk condition as noted above.     Current COVID symptoms:   - fever and chills  - cough-dry  - fatigue  - headache  - sore throat  - congestion   - nausea   Yes. Patient has at least one symptom as selected.     How many days since symptoms started? 5 days or less. Established patient, 12 years or older weighing at least 88.2 lbs, who has symptoms that started in the past 5 days, has not been hospitalized nor received treatment already, and is at risk for being very sick from COVID-19.     Treatment eligibility by RN:  Are you currently pregnant or nursing? No  Do you have a clinically significant hypersensitivity to nirmatrelvir or ritonavir, or toxic epidermal necrolysis (TEN) or Epps-Aditya Syndrome? No  Do you have a history of hepatitis, any hepatic impairment on the Problem List (such as Child-Rizzo Class C, cirrhosis, fatty liver disease,  alcoholic liver disease), or was the last liver lab (hepatic panel, ALT, AST, ALK Phos, bilirubin) elevated in the past 6 months? No  Do you have any history of severe renal impairment (eGFR < 30mL/min)? No    Is patient eligible to continue? Yes, patient meets all eligibility requirements for the RN COVID treatment (as denoted by all no responses above).     Current Outpatient Medications   Medication Sig Dispense Refill    aspirin 81 MG EC tablet [ASPIRIN 81 MG EC TABLET] Take 81 mg by mouth daily.      atorvastatin (LIPITOR) 20 MG tablet Take 1 tablet (20 mg) by mouth daily 90 tablet 3    glipiZIDE-metFORMIN (METAGLIP) 5-500 MG tablet TAKE 2 TABLETS BY MOUTH 2 TIMES A DAY BEFORE MEALS.  Strength: 5-500 mg 360 tablet 3    losartan (COZAAR) 100 MG tablet TAKE 1 TABLET BY MOUTH EVERY DAY 90 tablet 3    omeprazole (PRILOSEC) 20 MG capsule [OMEPRAZOLE (PRILOSEC) 20 MG CAPSULE] Take 20 mg by mouth daily.      pioglitazone (ACTOS) 30 MG tablet Take 1 tablet (30 mg) by mouth daily 90 tablet 3    semaglutide (OZEMPIC) 2 MG/3ML pen Inject 0.5 mg Subcutaneous every 7 days 9 mL 0       Medications from List 1 of the standing order (on medications that exclude the use of Paxlovid) that patient is taking: NONE. Is patient taking [place] Wort? No  Is patient taking [place] Wort or any meds from List 1? No.   Medications from List 2 of the standing order (on meds that provider needs to adjust) that patient is taking: NONE. Is patient on any of the meds from List 2? No.   Medications from List 3 of standing order (on meds that a RN needs to adjust) that patient is taking: atorvastatin (Lipitor): Instructed patient to stop atorvastatin while taking Paxlovid and restart atorvastatin 1 day after the completion of Paxlovid.  Is patient on any meds from List 3? Yes. Patient is on meds from list 3. No meds require a provider visit and at least one med required RN to adjust.     Paxlovid has an approximate 90% reduction in  hospitalization. Paxlovid can possibly cause altered sense of taste, diarrhea (loose, watery stools), high blood pressure, muscle aches.     Would patient like a Paxlovid prescription?   Yes.   Lab Results   Component Value Date    GFRESTIMATED >90 10/06/2023       Was last eGFR reduced? No, eGFR 60 or greater/ No Result on record. Patient can receive the normal renal function dose. Paxlovid Rx sent to [place] [facility]   [facility]  [address] [place] [address] [address], [place] [id number] [phone]     Temporary change to home medications:   Atorvastatin  (Lipitor)-Med List #3 Instruct patient to stop atorvastatin while taking Paxlovid and restart atorvastatin 1 day after the completion of Paxlovid.       All medication adjustments (holds, etc) were discussed with the patient and patient was asked to repeat back (teachback) their med adjustment.  Did patient understand med adjustment? Yes, patient repeated back and understood correctly.        Reviewed the following instructions with the patient:    Paxlovid (nimatrelvir and ritonavir)    How it works  Two medicines (nirmatrelvir and ritonavir) are taken together. They stop the virus from growing. Less amount of virus is easier for your body to fight.    How to take  Medicine comes in a daily container with both medicine tablets. Take by mouth twice daily (once in the morning, once at night) for 5 days.  The number of tablets to take varies by patient.  Don't chew or break capsules. Swallow whole.    When to take  Take as soon as possible after positive COVID-19 test result, and within 5 days of your first symptoms.    Possible side effects  Can cause altered sense of taste, diarrhea (loose, watery stools), high blood pressure, muscle aches.    AST   Date Value Ref Range Status   09/30/2022 32 10 - 50 U/L Final      Lab Results   Component Value Date    ALT 36 09/30/2022     Lab Results   Component Value Date    ALKPHOS 74 09/30/2022     Creatinine   Date Value Ref Range  Status   10/06/2023 0.89 0.67 - 1.17 mg/dL Final     GFR Estimate   Date Value Ref Range Status   10/06/2023 >90 >60 mL/min/1.73m2 Final   05/30/2023 89 >60 mL/min/1.73m2 Final     Comment:     eGFR calculated using 2021 CKD-EPI equation.   01/31/2023 >90 >60 mL/min/1.73m2 Final     Comment:     eGFR calculated using 2021 CKD-EPI equation.   05/19/2021 >60 >60 mL/min/1.73m2 Final   01/19/2021 >60 >60 mL/min/1.73m2 Final   09/17/2020 >60 >60 mL/min/1.73m2 Final     GFR Estimate If Black   Date Value Ref Range Status   05/19/2021 >60 >60 mL/min/1.73m2 Final   01/19/2021 >60 >60 mL/min/1.73m2 Final   09/17/2020 >60 >60 mL/min/1.73m2 Final     Vibha Nelson, RN, BSN  [facility]

## 2024-01-23 NOTE — TELEPHONE ENCOUNTER
Pt calling back and is positive for covid        COVID Positive/Requesting COVID treatment    Patient is positive for COVID and requesting treatment options.    Date of positive COVID test (PCR or at home)? Today at home  Current COVID symptoms: fever or chills, cough, fatigue, headache, sore throat, congestion or runny nose, and nausea or vomiting  Date COVID symptoms began: yesterday    Message should be routed to clinic RN pool. Best phone number to use for call back: [phone]

## 2024-01-30 ENCOUNTER — OFFICE VISIT (OUTPATIENT)
Dept: FAMILY MEDICINE | Facility: CLINIC | Age: 59
End: 2024-01-30
Payer: COMMERCIAL

## 2024-01-30 VITALS
DIASTOLIC BLOOD PRESSURE: 80 MMHG | HEIGHT: 71 IN | BODY MASS INDEX: 32.06 KG/M2 | SYSTOLIC BLOOD PRESSURE: 130 MMHG | WEIGHT: 229 LBS | HEART RATE: 105 BPM | TEMPERATURE: 97.3 F | OXYGEN SATURATION: 96 % | RESPIRATION RATE: 17 BRPM

## 2024-01-30 DIAGNOSIS — E66.811 CLASS 1 OBESITY DUE TO EXCESS CALORIES WITH SERIOUS COMORBIDITY AND BODY MASS INDEX (BMI) OF 31.0 TO 31.9 IN ADULT: ICD-10-CM

## 2024-01-30 DIAGNOSIS — E11.9 TYPE 2 DIABETES MELLITUS WITHOUT COMPLICATION, WITHOUT LONG-TERM CURRENT USE OF INSULIN (H): Primary | ICD-10-CM

## 2024-01-30 DIAGNOSIS — E78.5 HYPERLIPIDEMIA, UNSPECIFIED HYPERLIPIDEMIA TYPE: ICD-10-CM

## 2024-01-30 DIAGNOSIS — E66.09 CLASS 1 OBESITY DUE TO EXCESS CALORIES WITH SERIOUS COMORBIDITY AND BODY MASS INDEX (BMI) OF 31.0 TO 31.9 IN ADULT: ICD-10-CM

## 2024-01-30 DIAGNOSIS — I10 ESSENTIAL HYPERTENSION: ICD-10-CM

## 2024-01-30 LAB
HBA1C MFR BLD: 8.3 % (ref 0–5.6)
HOLD SPECIMEN: NORMAL
HOLD SPECIMEN: NORMAL

## 2024-01-30 PROCEDURE — 99214 OFFICE O/P EST MOD 30 MIN: CPT | Performed by: FAMILY MEDICINE

## 2024-01-30 PROCEDURE — 80048 BASIC METABOLIC PNL TOTAL CA: CPT | Performed by: FAMILY MEDICINE

## 2024-01-30 PROCEDURE — 36415 COLL VENOUS BLD VENIPUNCTURE: CPT | Performed by: FAMILY MEDICINE

## 2024-01-30 PROCEDURE — 83036 HEMOGLOBIN GLYCOSYLATED A1C: CPT | Performed by: FAMILY MEDICINE

## 2024-01-30 ASSESSMENT — PAIN SCALES - GENERAL: PAINLEVEL: NO PAIN (0)

## 2024-01-30 NOTE — PROGRESS NOTES
Assessment/Plan:    Type 2 diabetes mellitus without complication, without long-term current use of insulin (H)  Type 2 diabetes with 18 pound weight loss since October 6, 2023 while on Ozempic titrated to half milligram weekly.  Patient continues glipizide metformin 5/500 using 2 tablets twice daily and remains on pioglitazone 30 mg daily which had been associated with weight gain.  A1c did increase slightly from 8% up to 8.3% today.  Ensure weight goal less than 220 pounds initially, less than 210 pounds ideally and will increase Ozempic from half milligram weekly up to 1 mg weekly dosing.  Reassess at follow-up no later than 4 months.  - Semaglutide, 1 MG/DOSE, (OZEMPIC) 4 MG/3ML pen  Dispense: 9 mL; Refill: 1  - Basic metabolic panel    Essential hypertension  Hypertension appears stable with losartan 100 mg daily.  Tolerating well.  Medication monitoring completed today.  - Basic metabolic panel    Hyperlipidemia, unspecified hyperlipidemia type  Remains on atorvastatin 20 mg daily.  Had held recently due to Paxlovid utilization due to COVID-19 infection.  Will resume atorvastatin today.    Class 1 obesity due to excess calories with serious comorbidity and body mass index (BMI) of 31.0 to 31.9 in adult  Dietary and exercise modification reviewed for weight goal less than 220 pounds initially, less than 210 pounds ideally.  Reassess at follow-up diabetes visit in 4 months.      The following high BMI interventions were performed this visit: encouragement to exercise, weight monitoring, weight loss from baseline weight, and lifestyle education regarding diet         Subjective:    Tyrese Pineda is seen today for follow-up assessment.  Type 2 diabetes.  Prior office visit October 6, 2023.  Discussion regarding weight gain associate with pioglitazone use.  Elected to discontinue pioglitazone 30 mg daily while continuing glipizide metformin 5/500 using 2 tablets twice daily.  Initiated Ozempic quarter milligram  "weekly and then titrated to half milligram weekly dose.  Tolerating well without side effects described.  Does have underlying hypertension treated with losartan 100 mg daily and atorvastatin 20 mg daily for lipid management respectively.  Trying to stay active.  Feet do feel tingling and cold at times.  Typically wears white cotton socks typically at home as well as at bedtime.  Comprehensive review of systems as above otherwise all negative.       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 lupus   Sister and nephew with Celiac disease   No smoke   EtOH: [employer] \"1-2/night\" occ 4 on weekend if party, etc.   Surgeries: 3/00 - bilateral Lasik eye surgery; bilateral inguinal hernia repair 5/2014 (Dr. Edward Merino); bilateral knee arthroscopy and meniscectomy described   12/9/05 - [place]'s (pneumonia)   Pneumovax 12/05   Tdap 6/25/08   [employer]       Past Surgical History:   Procedure Laterality Date    HERNIA REPAIR Bilateral May, 2014    Dr. Edward ASCENCIO Bilateral         Family History   Problem Relation Age of Onset    Celiac Disease Mother     Heart Disease Father         Past Medical History:   Diagnosis Date    Diabetes mellitus (H)     Hypertension         Social History     Tobacco Use    Smoking status: Former     Types: Cigarettes    Smokeless tobacco: Never   Vaping Use    Vaping Use: Never used   Substance Use Topics    Alcohol use: Yes    Drug use: No        Current Outpatient Medications   Medication Sig Dispense Refill    aspirin 81 MG EC tablet [ASPIRIN 81 MG EC TABLET] Take 81 mg by mouth daily.      atorvastatin (LIPITOR) 20 MG tablet Take 1 tablet (20 mg) by mouth daily 90 tablet 3    glipiZIDE-metFORMIN (METAGLIP) 5-500 MG tablet TAKE 2 TABLETS BY MOUTH 2 TIMES A DAY BEFORE MEALS.  Strength: 5-500 mg 360 tablet 3    losartan (COZAAR) 100 MG tablet TAKE 1 TABLET BY " "MOUTH EVERY DAY 90 tablet 3    omeprazole (PRILOSEC) 20 MG capsule [OMEPRAZOLE (PRILOSEC) 20 MG CAPSULE] Take 20 mg by mouth daily.      Semaglutide, 1 MG/DOSE, (OZEMPIC) 4 MG/3ML pen Inject 1 mg Subcutaneous every 7 days 9 mL 1          Objective:    Vitals:    01/30/24 1358   BP: 130/80   Pulse: 105   Resp: 17   Temp: 97.3  F (36.3  C)   SpO2: 96%   Weight: 103.9 kg (229 lb)   Height: 1.803 m (5' 11\")      Body mass index is 31.94 kg/m .    Alert.  No apparent distress.  Chest clear.  Cardiac exam regular.  Monofilament testing normal.  Dorsalis pedis and posterior tibial pulses intact.  No ankle edema.  Feet are cool to the touch.      This note has been dictated using voice recognition software and as a result may contain minor grammatical errors and unintended word substitutions.         Answers submitted by the patient for this visit:  Diabetes Visit (Submitted on 1/30/2024)  Chief Complaint: Chronic problems general questions HPI Form  Frequency of checking blood sugars:: not at all  Diabetic concerns:: none  Paraesthesia present:: numbness in feet, burning in feet  General Questionnaire (Submitted on 1/30/2024)  Chief Complaint: Chronic problems general questions HPI Form  How many servings of fruits and vegetables do you eat daily?: 0-1  On average, how many sweetened beverages do you drink each day (Examples: soda, juice, sweet tea, etc.  Do NOT count diet or artificially sweetened beverages)?: 1  How many minutes a day do you exercise enough to make your heart beat faster?: 9 or less  How many days a week do you exercise enough to make your heart beat faster?: 3 or less  How many days per week do you miss taking your medication?: 0    "

## 2024-01-31 LAB
ANION GAP SERPL CALCULATED.3IONS-SCNC: 13 MMOL/L (ref 7–15)
BUN SERPL-MCNC: 17.2 MG/DL (ref 8–23)
CALCIUM SERPL-MCNC: 10.5 MG/DL (ref 8.6–10)
CHLORIDE SERPL-SCNC: 100 MMOL/L (ref 98–107)
CREAT SERPL-MCNC: 0.97 MG/DL (ref 0.67–1.17)
DEPRECATED HCO3 PLAS-SCNC: 25 MMOL/L (ref 22–29)
EGFRCR SERPLBLD CKD-EPI 2021: 90 ML/MIN/1.73M2
GLUCOSE SERPL-MCNC: 114 MG/DL (ref 70–99)
POTASSIUM SERPL-SCNC: 4.9 MMOL/L (ref 3.4–5.3)
SODIUM SERPL-SCNC: 138 MMOL/L (ref 135–145)

## 2024-02-01 DIAGNOSIS — E11.9 TYPE 2 DIABETES MELLITUS WITHOUT COMPLICATION, WITHOUT LONG-TERM CURRENT USE OF INSULIN (H): ICD-10-CM

## 2024-02-01 RX ORDER — GLIPIZIDE AND METFORMIN HCL 5; 500 MG/1; MG/1
TABLET, FILM COATED ORAL
Qty: 360 TABLET | Refills: 0 | Status: SHIPPED | OUTPATIENT
Start: 2024-02-01 | End: 2024-03-25

## 2024-03-22 DIAGNOSIS — E11.9 TYPE 2 DIABETES MELLITUS WITHOUT COMPLICATION, WITHOUT LONG-TERM CURRENT USE OF INSULIN (H): ICD-10-CM

## 2024-03-25 RX ORDER — GLIPIZIDE AND METFORMIN HCL 5; 500 MG/1; MG/1
TABLET, FILM COATED ORAL
Qty: 360 TABLET | Refills: 0 | Status: SHIPPED | OUTPATIENT
Start: 2024-03-25 | End: 2024-05-20

## 2024-05-02 ENCOUNTER — PATIENT OUTREACH (OUTPATIENT)
Dept: GASTROENTEROLOGY | Facility: CLINIC | Age: 59
End: 2024-05-02
Payer: COMMERCIAL

## 2024-05-02 DIAGNOSIS — Z12.11 SPECIAL SCREENING FOR MALIGNANT NEOPLASMS, COLON: Primary | ICD-10-CM

## 2024-05-02 NOTE — PROGRESS NOTES
"CRC Screening Colonoscopy Referral Review    Patient meets the inclusion criteria for screening colonoscopy standing order.    Ordering/Referring Provider:  Luis Fernando Madrid      BMI: Estimated body mass index is 31.94 kg/m  as calculated from the following:    Height as of 1/30/24: 1.803 m (5' 11\").    Weight as of 1/30/24: 103.9 kg (229 lb).     Sedation:  Does patient have any of the following conditions affecting sedation?  No medical conditions affecting sedation.    Previous Scopes:  Any previous recommendations or follow up needs based on previous scope?  na / No recommendations.    Medical Concerns to Postpone Order:  Does patient have any of the following medical concerns that should postpone/delay colonoscopy referral?  No medical conditions affecting colonoscopy referral.    Final Referral Details:  Based on patient's medical history patient is appropriate for referral order with moderate sedation. If patient's BMI > 50 do not schedule in [facility].  "

## 2024-05-19 DIAGNOSIS — E11.9 TYPE 2 DIABETES MELLITUS WITHOUT COMPLICATION, WITHOUT LONG-TERM CURRENT USE OF INSULIN (H): ICD-10-CM

## 2024-05-20 RX ORDER — GLIPIZIDE AND METFORMIN HCL 5; 500 MG/1; MG/1
TABLET, FILM COATED ORAL
Qty: 360 TABLET | Refills: 3 | Status: SHIPPED | OUTPATIENT
Start: 2024-05-20

## 2024-05-31 ENCOUNTER — OFFICE VISIT (OUTPATIENT)
Dept: FAMILY MEDICINE | Facility: CLINIC | Age: 59
End: 2024-05-31
Payer: COMMERCIAL

## 2024-05-31 VITALS
SYSTOLIC BLOOD PRESSURE: 118 MMHG | TEMPERATURE: 98.3 F | RESPIRATION RATE: 16 BRPM | WEIGHT: 231.1 LBS | DIASTOLIC BLOOD PRESSURE: 84 MMHG | HEART RATE: 88 BPM | BODY MASS INDEX: 32.23 KG/M2 | OXYGEN SATURATION: 94 %

## 2024-05-31 DIAGNOSIS — E11.9 TYPE 2 DIABETES MELLITUS WITHOUT COMPLICATION, WITHOUT LONG-TERM CURRENT USE OF INSULIN (H): Primary | ICD-10-CM

## 2024-05-31 DIAGNOSIS — E78.5 HYPERLIPIDEMIA, UNSPECIFIED HYPERLIPIDEMIA TYPE: ICD-10-CM

## 2024-05-31 DIAGNOSIS — G89.29 CHRONIC BILATERAL LOW BACK PAIN WITHOUT SCIATICA: ICD-10-CM

## 2024-05-31 DIAGNOSIS — I10 ESSENTIAL HYPERTENSION: ICD-10-CM

## 2024-05-31 DIAGNOSIS — M54.50 CHRONIC BILATERAL LOW BACK PAIN WITHOUT SCIATICA: ICD-10-CM

## 2024-05-31 LAB — HBA1C MFR BLD: 7.7 % (ref 0–5.6)

## 2024-05-31 PROCEDURE — 83036 HEMOGLOBIN GLYCOSYLATED A1C: CPT | Performed by: FAMILY MEDICINE

## 2024-05-31 PROCEDURE — 80048 BASIC METABOLIC PNL TOTAL CA: CPT | Performed by: FAMILY MEDICINE

## 2024-05-31 PROCEDURE — 36415 COLL VENOUS BLD VENIPUNCTURE: CPT | Performed by: FAMILY MEDICINE

## 2024-05-31 PROCEDURE — 99214 OFFICE O/P EST MOD 30 MIN: CPT | Performed by: FAMILY MEDICINE

## 2024-05-31 RX ORDER — DIAZEPAM 10 MG
5-10 TABLET ORAL EVERY 8 HOURS PRN
Qty: 60 TABLET | Refills: 0 | Status: SHIPPED | OUTPATIENT
Start: 2024-05-31

## 2024-05-31 ASSESSMENT — PAIN SCALES - GENERAL: PAINLEVEL: NO PAIN (0)

## 2024-05-31 NOTE — PROGRESS NOTES
Assessment/Plan:    Type 2 diabetes mellitus without complication, without long-term current use of insulin (H)  Type 2 diabetes improved with A1c decreasing from 8.3% to 7.7% despite 2 pound weight gain.  Will increase Ozempic from 1 mg weekly instead up to 2 mg weekly while continuing glipizide metformin 5/500 using 2 tablets twice daily.  Previously discontinued pioglitazone due to weight gain issues last October 2023.  Reassess at physical exam October 2024 anticipated.  - Hemoglobin A1c  - Hemoglobin A1c  - Semaglutide, 2 MG/DOSE, (OZEMPIC) 8 MG/3ML pen  Dispense: 9 mL; Refill: 3    Essential hypertension  Hypertension appears fair control with blood pressure 118/84 on recheck continuing losartan 100 mg daily.  Medication monitoring completed.  - Basic metabolic panel  - Basic metabolic panel    Hyperlipidemia, unspecified hyperlipidemia type  Hyperlipidemia is managed with atorvastatin 20 mg daily with cholesterol results near goal October 6, 2023 and will reassess at physical exam this October.    Chronic bilateral low back pain without sciatica  Diazepam utilized between 5 mg and 10 mg every 8 hours as needed muscle spasm.  Avoidance of exacerbating triggers.  Uses sparingly.  - diazepam (VALIUM) 10 MG tablet  Dispense: 60 tablet; Refill: 0               Subjective:    Tyrese Pineda is seen today for follow-up assessment.  Previously had increase Ozempic from 1/2 mg a week to 1 mg a week.  Tolerating well without GI distress.  2 pound weight gain since January 30, 2024.  Uses glipizide metformin 5/500 using 2 tablets twice daily.  Discontinue pioglitazone 30 mg daily in October 2023 due to weight gain issues associated with it.  Losartan 100 mg daily for hypertension and atorvastatin 20 mg daily for lipid management.  Denies recent illness.  Needs refill on diazepam which she is use historically for muscle spasm associate with chronic recurrent low back pain.  Trying to avoid exacerbating triggers.   "Comprehensive review of systems as above otherwise all negative.       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 lupus   Sister and nephew with Celiac disease   No smoke   EtOH: [employer] \"1-2/night\" occ 4 on weekend if party, etc.   Surgeries: 3/00 - bilateral Lasik eye surgery; bilateral inguinal hernia repair 5/2014 (Dr. Edward Merino); bilateral knee arthroscopy and meniscectomy described   12/9/05 - [place]'s (pneumonia)   Pneumovax 12/05   Tdap 6/25/08   [employer]       Past Surgical History:   Procedure Laterality Date    HERNIA REPAIR Bilateral May, 2014    Dr. Edward ASCENCIO Bilateral         Family History   Problem Relation Age of Onset    Celiac Disease Mother     Heart Disease Father         Past Medical History:   Diagnosis Date    Diabetes mellitus (H)     Hypertension         Social History     Tobacco Use    Smoking status: Former     Types: Cigarettes    Smokeless tobacco: Never   Vaping Use    Vaping status: Never Used   Substance Use Topics    Alcohol use: Yes    Drug use: No        Current Outpatient Medications   Medication Sig Dispense Refill    aspirin 81 MG EC tablet [ASPIRIN 81 MG EC TABLET] Take 81 mg by mouth daily.      atorvastatin (LIPITOR) 20 MG tablet Take 1 tablet (20 mg) by mouth daily 90 tablet 3    diazepam (VALIUM) 10 MG tablet Take 0.5-1 tablets (5-10 mg) by mouth every 8 hours as needed for muscle spasms 60 tablet 0    glipiZIDE-metFORMIN (METAGLIP) 5-500 MG tablet TAKE 2 TABLETS BY MOUTH TWICE  DAILY BEFORE MEALS 360 tablet 3    losartan (COZAAR) 100 MG tablet TAKE 1 TABLET BY MOUTH EVERY DAY 90 tablet 3    omeprazole (PRILOSEC) 20 MG capsule [OMEPRAZOLE (PRILOSEC) 20 MG CAPSULE] Take 20 mg by mouth daily.      Semaglutide, 1 MG/DOSE, (OZEMPIC) 4 MG/3ML pen Inject 1 mg Subcutaneous every 7 days 9 mL 1    Semaglutide, 2 MG/DOSE, (OZEMPIC) 8 MG/3ML " pen Inject 2 mg Subcutaneous every 7 days 9 mL 3          Objective:    Vitals:    05/31/24 0816 05/31/24 0820 05/31/24 0833   BP: (!) 122/92 (!) 118/90 118/84   BP Location: Right arm Right arm    Patient Position: Sitting Sitting    Cuff Size: Adult Large Adult Large    Pulse: 88     Resp: 16     Temp: 98.3  F (36.8  C)     TempSrc: Temporal     SpO2: 94%     Weight: 104.8 kg (231 lb 1.6 oz)        Body mass index is 32.23 kg/m .    Alert.  No apparent distress.  Chest clear.  Cardiac exam regular.  Extremities warm and dry.      This note has been dictated using voice recognition software and as a result may contain minor grammatical errors and unintended word substitutions.   Answers submitted by the patient for this visit:  Diabetes Visit (Submitted on 5/26/2024)  Chief Complaint: Chronic problems general questions HPI Form  Frequency of checking blood sugars:: not at all  Diabetic concerns:: other  Paraesthesia present:: none of these symptoms  General Questionnaire (Submitted on 5/26/2024)  Chief Complaint: Chronic problems general questions HPI Form  How many servings of fruits and vegetables do you eat daily?: 0-1  On average, how many sweetened beverages do you drink each day (Examples: soda, juice, sweet tea, etc.  Do NOT count diet or artificially sweetened beverages)?: 1  How many minutes a day do you exercise enough to make your heart beat faster?: 10 to 19  How many days a week do you exercise enough to make your heart beat faster?: 4  How many days per week do you miss taking your medication?: 1  What makes it hard for you to take your medication every day?: remembering to take

## 2024-06-01 LAB
ANION GAP SERPL CALCULATED.3IONS-SCNC: 10 MMOL/L (ref 7–15)
BUN SERPL-MCNC: 11.4 MG/DL (ref 8–23)
CALCIUM SERPL-MCNC: 10.1 MG/DL (ref 8.8–10.2)
CHLORIDE SERPL-SCNC: 101 MMOL/L (ref 98–107)
CREAT SERPL-MCNC: 0.93 MG/DL (ref 0.67–1.17)
DEPRECATED HCO3 PLAS-SCNC: 25 MMOL/L (ref 22–29)
EGFRCR SERPLBLD CKD-EPI 2021: >90 ML/MIN/1.73M2
GLUCOSE SERPL-MCNC: 145 MG/DL (ref 70–99)
POTASSIUM SERPL-SCNC: 5.1 MMOL/L (ref 3.4–5.3)
SODIUM SERPL-SCNC: 136 MMOL/L (ref 135–145)

## 2024-08-27 ENCOUNTER — TRANSFERRED RECORDS (OUTPATIENT)
Dept: HEALTH INFORMATION MANAGEMENT | Facility: CLINIC | Age: 59
End: 2024-08-27
Payer: COMMERCIAL

## 2024-08-27 LAB — RETINOPATHY: NEGATIVE

## 2024-09-18 DIAGNOSIS — I10 ESSENTIAL HYPERTENSION: ICD-10-CM

## 2024-09-18 DIAGNOSIS — E78.5 HYPERLIPIDEMIA, UNSPECIFIED HYPERLIPIDEMIA TYPE: ICD-10-CM

## 2024-09-18 RX ORDER — LOSARTAN POTASSIUM 100 MG/1
TABLET ORAL
Qty: 90 TABLET | Refills: 3 | Status: SHIPPED | OUTPATIENT
Start: 2024-09-18

## 2024-09-18 RX ORDER — ATORVASTATIN CALCIUM 20 MG/1
20 TABLET, FILM COATED ORAL DAILY
Qty: 90 TABLET | Refills: 3 | Status: SHIPPED | OUTPATIENT
Start: 2024-09-18

## 2024-09-27 ENCOUNTER — TRANSFERRED RECORDS (OUTPATIENT)
Dept: HEALTH INFORMATION MANAGEMENT | Facility: CLINIC | Age: 59
End: 2024-09-27
Payer: COMMERCIAL

## 2024-10-20 SDOH — HEALTH STABILITY: PHYSICAL HEALTH: ON AVERAGE, HOW MANY MINUTES DO YOU ENGAGE IN EXERCISE AT THIS LEVEL?: PATIENT DECLINED

## 2024-10-20 SDOH — HEALTH STABILITY: PHYSICAL HEALTH: ON AVERAGE, HOW MANY DAYS PER WEEK DO YOU ENGAGE IN MODERATE TO STRENUOUS EXERCISE (LIKE A BRISK WALK)?: 2 DAYS

## 2024-10-20 ASSESSMENT — SOCIAL DETERMINANTS OF HEALTH (SDOH): HOW OFTEN DO YOU GET TOGETHER WITH FRIENDS OR RELATIVES?: THREE TIMES A WEEK

## 2024-10-22 ENCOUNTER — OFFICE VISIT (OUTPATIENT)
Dept: FAMILY MEDICINE | Facility: CLINIC | Age: 59
End: 2024-10-22
Payer: COMMERCIAL

## 2024-10-22 VITALS
RESPIRATION RATE: 14 BRPM | BODY MASS INDEX: 32.03 KG/M2 | TEMPERATURE: 98 F | WEIGHT: 228.8 LBS | HEART RATE: 82 BPM | DIASTOLIC BLOOD PRESSURE: 82 MMHG | HEIGHT: 71 IN | SYSTOLIC BLOOD PRESSURE: 124 MMHG | OXYGEN SATURATION: 95 %

## 2024-10-22 DIAGNOSIS — Z23 ENCOUNTER FOR IMMUNIZATION: ICD-10-CM

## 2024-10-22 DIAGNOSIS — M54.50 CHRONIC BILATERAL LOW BACK PAIN WITHOUT SCIATICA: ICD-10-CM

## 2024-10-22 DIAGNOSIS — D12.6 TUBULAR ADENOMA OF COLON: ICD-10-CM

## 2024-10-22 DIAGNOSIS — E78.5 HYPERLIPIDEMIA, UNSPECIFIED HYPERLIPIDEMIA TYPE: ICD-10-CM

## 2024-10-22 DIAGNOSIS — G47.33 SEVERE OBSTRUCTIVE SLEEP APNEA: ICD-10-CM

## 2024-10-22 DIAGNOSIS — E11.9 TYPE 2 DIABETES MELLITUS WITHOUT COMPLICATION, WITHOUT LONG-TERM CURRENT USE OF INSULIN (H): ICD-10-CM

## 2024-10-22 DIAGNOSIS — I10 ESSENTIAL HYPERTENSION: ICD-10-CM

## 2024-10-22 DIAGNOSIS — E66.09 CLASS 1 OBESITY DUE TO EXCESS CALORIES WITH SERIOUS COMORBIDITY AND BODY MASS INDEX (BMI) OF 31.0 TO 31.9 IN ADULT: ICD-10-CM

## 2024-10-22 DIAGNOSIS — Z12.5 SCREENING FOR PROSTATE CANCER: ICD-10-CM

## 2024-10-22 DIAGNOSIS — G89.29 CHRONIC BILATERAL LOW BACK PAIN WITHOUT SCIATICA: ICD-10-CM

## 2024-10-22 DIAGNOSIS — Z00.00 ROUTINE PHYSICAL EXAMINATION: Primary | ICD-10-CM

## 2024-10-22 DIAGNOSIS — E66.811 CLASS 1 OBESITY DUE TO EXCESS CALORIES WITH SERIOUS COMORBIDITY AND BODY MASS INDEX (BMI) OF 31.0 TO 31.9 IN ADULT: ICD-10-CM

## 2024-10-22 DIAGNOSIS — K21.9 GASTROESOPHAGEAL REFLUX DISEASE WITHOUT ESOPHAGITIS: ICD-10-CM

## 2024-10-22 LAB
ALBUMIN SERPL BCG-MCNC: 4.4 G/DL (ref 3.5–5.2)
ALP SERPL-CCNC: 95 U/L (ref 40–150)
ALT SERPL W P-5'-P-CCNC: 35 U/L (ref 0–70)
ANION GAP SERPL CALCULATED.3IONS-SCNC: 12 MMOL/L (ref 7–15)
ANION GAP SERPL CALCULATED.3IONS-SCNC: 12 MMOL/L (ref 7–15)
AST SERPL W P-5'-P-CCNC: 28 U/L (ref 0–45)
BILIRUB SERPL-MCNC: 0.5 MG/DL
BUN SERPL-MCNC: 11.5 MG/DL (ref 8–23)
BUN SERPL-MCNC: 11.5 MG/DL (ref 8–23)
CALCIUM SERPL-MCNC: 9.8 MG/DL (ref 8.8–10.4)
CALCIUM SERPL-MCNC: 9.8 MG/DL (ref 8.8–10.4)
CHLORIDE SERPL-SCNC: 104 MMOL/L (ref 98–107)
CHLORIDE SERPL-SCNC: 104 MMOL/L (ref 98–107)
CHOLEST SERPL-MCNC: 140 MG/DL
CREAT SERPL-MCNC: 1 MG/DL (ref 0.67–1.17)
CREAT SERPL-MCNC: 1 MG/DL (ref 0.67–1.17)
CREAT UR-MCNC: 269 MG/DL
EGFRCR SERPLBLD CKD-EPI 2021: 86 ML/MIN/1.73M2
EGFRCR SERPLBLD CKD-EPI 2021: 86 ML/MIN/1.73M2
EST. AVERAGE GLUCOSE BLD GHB EST-MCNC: 189 MG/DL
GLUCOSE SERPL-MCNC: 139 MG/DL (ref 70–99)
GLUCOSE SERPL-MCNC: 139 MG/DL (ref 70–99)
HBA1C MFR BLD: 8.2 % (ref 0–5.6)
HCO3 SERPL-SCNC: 24 MMOL/L (ref 22–29)
HCO3 SERPL-SCNC: 24 MMOL/L (ref 22–29)
HDLC SERPL-MCNC: 42 MG/DL
LDLC SERPL CALC-MCNC: 65 MG/DL
MICROALBUMIN UR-MCNC: <12 MG/L
MICROALBUMIN/CREAT UR: NORMAL MG/G{CREAT}
NONHDLC SERPL-MCNC: 98 MG/DL
POTASSIUM SERPL-SCNC: 4.6 MMOL/L (ref 3.4–5.3)
POTASSIUM SERPL-SCNC: 4.6 MMOL/L (ref 3.4–5.3)
PROT SERPL-MCNC: 7.6 G/DL (ref 6.4–8.3)
PSA SERPL DL<=0.01 NG/ML-MCNC: 1.34 NG/ML (ref 0–4.5)
SODIUM SERPL-SCNC: 140 MMOL/L (ref 135–145)
SODIUM SERPL-SCNC: 140 MMOL/L (ref 135–145)
TRIGL SERPL-MCNC: 165 MG/DL

## 2024-10-22 PROCEDURE — 90673 RIV3 VACCINE NO PRESERV IM: CPT | Performed by: FAMILY MEDICINE

## 2024-10-22 PROCEDURE — G0103 PSA SCREENING: HCPCS | Performed by: FAMILY MEDICINE

## 2024-10-22 PROCEDURE — 82570 ASSAY OF URINE CREATININE: CPT | Performed by: FAMILY MEDICINE

## 2024-10-22 PROCEDURE — 82043 UR ALBUMIN QUANTITATIVE: CPT | Performed by: FAMILY MEDICINE

## 2024-10-22 PROCEDURE — 80053 COMPREHEN METABOLIC PANEL: CPT | Performed by: FAMILY MEDICINE

## 2024-10-22 PROCEDURE — 90472 IMMUNIZATION ADMIN EACH ADD: CPT | Performed by: FAMILY MEDICINE

## 2024-10-22 PROCEDURE — 90678 RSV VACC PREF BIVALENT IM: CPT | Performed by: FAMILY MEDICINE

## 2024-10-22 PROCEDURE — 91320 SARSCV2 VAC 30MCG TRS-SUC IM: CPT | Performed by: FAMILY MEDICINE

## 2024-10-22 PROCEDURE — 99396 PREV VISIT EST AGE 40-64: CPT | Mod: 25 | Performed by: FAMILY MEDICINE

## 2024-10-22 PROCEDURE — 80061 LIPID PANEL: CPT | Performed by: FAMILY MEDICINE

## 2024-10-22 PROCEDURE — 84295 ASSAY OF SERUM SODIUM: CPT | Mod: 59 | Performed by: FAMILY MEDICINE

## 2024-10-22 PROCEDURE — 90480 ADMN SARSCOV2 VAC 1/ONLY CMP: CPT | Performed by: FAMILY MEDICINE

## 2024-10-22 PROCEDURE — 90471 IMMUNIZATION ADMIN: CPT | Performed by: FAMILY MEDICINE

## 2024-10-22 PROCEDURE — 83036 HEMOGLOBIN GLYCOSYLATED A1C: CPT | Performed by: FAMILY MEDICINE

## 2024-10-22 PROCEDURE — 36415 COLL VENOUS BLD VENIPUNCTURE: CPT | Performed by: FAMILY MEDICINE

## 2024-10-22 ASSESSMENT — PAIN SCALES - GENERAL: PAINLEVEL: NO PAIN (0)

## 2024-10-22 NOTE — PROGRESS NOTES
Assessment/Plan:     Routine physical examination  Routine healthcare maintenance.  Preventative cares reviewed.  Annual physical exams to continue.    Type 2 diabetes mellitus without complication, without long-term current use of insulin (H)  Type 2 diabetes reviewed with A1c increasing from 7.7% to 8.2%.  Weight goal remains less than 220 pounds initially, less than 210 pounds ideally.  Continues Ozempic 2 mg weekly.  Low-dose aspirin.  Glipizide metformin 5/500 using 2 tablets twice daily.  Microalbumin screen to be updated.  Monofilament testing was normal.  Diabetic eye exam normal August 29, 2024 without retinopathy.  - Basic metabolic panel  (Ca, Cl, CO2, Creat, Gluc, K, Na, BUN)  - Hemoglobin A1c  - Albumin Random Urine Quantitative with Creat Ratio  - Comprehensive metabolic panel  - Comprehensive metabolic panel    Class 1 obesity due to excess calories with serious comorbidity and body mass index (BMI) of 31.0 to 31.9 in adult  Weight goal less than 220 pounds initially, less than 210 pounds ideally.    Essential hypertension  Losartan 100 mg daily continuing.  Blood pressure at goal with blood pressure 124/82 on recheck.  - Comprehensive metabolic panel  - Comprehensive metabolic panel    Hyperlipidemia, unspecified hyperlipidemia type  Remains on atorvastatin 20 mg daily.  - Lipid panel reflex to direct LDL Fasting  - Lipid panel reflex to direct LDL Fasting    Gastroesophageal reflux disease without esophagitis  History of reflux, well-controlled.    Chronic bilateral low back pain without sciatica  Diazepam 5 to 10 mg every 8 hours as needed for exacerbations otherwise no current concern.    Encounter for immunization  Updated immunizations provided.  - RSV VACCINE (ABRYSVO)  - INFLUENZA VACCINE TRIVALENT(FLUBLOK)  - COVID-19 12+ (PFIZER)    Screening for prostate cancer  CSA for prostate cancer screening based on age criteria.    Tubular adenoma of colon  Colonoscopy September 27, 2024 with  "3-year follow-up recommendation with double prep.    Severe obstructive sleep apnea  Utilizing CPAP for JAMAR management.  Tolerated well.  Occasional mask leak.       I have had an Advance Directives discussion with the patient.  The following high BMI interventions were performed this visit: encouragement to exercise, weight monitoring, weight loss from baseline weight, and lifestyle education regarding diet.  Ensure ongoing efforts to achieve weight goal < 220 pounds initially, < 210 pounds ideally.           Subjective:     Tyrese Pineda is a 59 year old male who presents for an annual exam.  In general doing well.  Type 2 diabetes reviewed.  Did buy caramel corn from [employer] but did not start eating until after his appointment.  Glipizide metformin 5/500 using 2 tablets twice daily with low-dose aspirin currently.  Also has titrated Ozempic to 2 mg weekly without GI distress.  Had diabetic eye exam August 29, 2024 without retinopathy.  Monofilament testing today was normal.  Prior microalbumin screen is fine.  Losartan 100 mg daily for hypertension management.  Atorvastatin 20 mg daily for lipid management.  Diazepam between 5 and 10 mg every 8 hours as needed due to lower back pain with exacerbation without current concerns for sciatica.  Tubular adenoma x 3 on colonoscopy September 27, 2024 and was recommended to follow-up 3-year interval and continue double prep prior.  CPAP utilized for JAMAR.  Comprehensive review of systems as above otherwise all negative.     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 lupus   Sister and nephew with Celiac disease   No smoke   EtOH: [employer] \"1-2/night\" occ 4 on weekend if party, etc.   Surgeries: 3/00 - bilateral Lasik eye surgery; bilateral inguinal hernia repair 5/2014 (Dr. Edward Merino); bilateral knee arthroscopy and meniscectomy described   12/9/05 - [facility] (pneumonia)   Pneumovax 12/05   Tdap 6/25/08   [employer]     Healthy Habits:   HPI     Immunization History   Administered Date(s) Administered    COVID-19 12+ (Pfizer) 11/09/2023    COVID-19 Bivalent 12+ (Pfizer) 09/30/2022    COVID-19 MONOVALENT 12+ (Pfizer) 03/17/2021, 04/07/2021, 12/06/2021    DT (PEDS <7y) 01/01/1993, 04/02/2004    Flu, Unspecified 10/01/2015, 10/11/2016    HepA, Unspecified 05/19/2010, 05/04/2011    Hepatitis A (ADULT 19+) 05/19/2010, 05/04/2011    Influenza (IIV3) PF 09/17/2010, 09/26/2012    Influenza Vaccine 18-64 (Flublok) 12/14/2018, 12/31/2019, 09/17/2020, 09/21/2021, 09/30/2022, 10/06/2023    Influenza Vaccine >6 months,quad, PF 12/18/2013, 12/18/2013, 11/05/2014, 11/05/2014    Influenza Vaccine, 6+MO IM (QUADRIVALENT W/PRESERVATIVES) 09/17/2010, 09/26/2012, 10/01/2015, 10/11/2016, 11/21/2017    Pneumococcal 20 valent Conjugate (Prevnar 20) 09/30/2022    Pneumococcal 23 valent 08/15/2014    TDAP (Adacel,Boostrix) 06/25/2008, 09/07/2011, 05/27/2022    Td (Adult), Adsorbed 01/01/1993, 04/02/2004    Td,adult,historic,unspecified 01/01/1993, 04/02/2004    Zoster recombinant adjuvanted (SHINGRIX) 08/21/2018, 12/14/2018     Immunization status: Will update RSV, flu and COVID immunization.    Current Outpatient Medications   Medication Sig Dispense Refill    aspirin 81 MG EC tablet [ASPIRIN 81 MG EC TABLET] Take 81 mg by mouth daily.      atorvastatin (LIPITOR) 20 MG tablet TAKE ONE TABLET BY MOUTH EVERY DAY 90 tablet 3    diazepam (VALIUM) 10 MG tablet Take 0.5-1 tablets (5-10 mg) by mouth every 8 hours as needed for muscle spasms 60 tablet 0    glipiZIDE-metFORMIN (METAGLIP) 5-500 MG tablet TAKE 2 TABLETS BY MOUTH TWICE  DAILY BEFORE MEALS 360 tablet 3    losartan (COZAAR) 100 MG tablet TAKE ONE TABLET BY MOUTH EVERY DAY 90 tablet 3    omeprazole (PRILOSEC) 20 MG capsule [OMEPRAZOLE (PRILOSEC) 20 MG CAPSULE] Take 20 mg by mouth daily.      Semaglutide, 2 MG/DOSE, (OZEMPIC)  8 MG/3ML pen Inject 2 mg Subcutaneous every 7 days 9 mL 3    Semaglutide, 1 MG/DOSE, (OZEMPIC) 4 MG/3ML pen Inject 1 mg Subcutaneous every 7 days (Patient not taking: Reported on 10/22/2024) 9 mL 1     Past Medical History:   Diagnosis Date    Diabetes mellitus (H)     Hypertension      Past Surgical History:   Procedure Laterality Date    HERNIA REPAIR Bilateral May, 2014    Dr. Edward Merino    LASJOON Bilateral      Lisinopril  Family History   Problem Relation Age of Onset    Celiac Disease Mother     Heart Disease Father      Social History     Socioeconomic History    Marital status:      Spouse name: Not on file    Number of children: Not on file    Years of education: Not on file    Highest education level: Not on file   Occupational History    Not on file   Tobacco Use    Smoking status: Former     Types: Cigarettes    Smokeless tobacco: Never   Vaping Use    Vaping status: Never Used   Substance and Sexual Activity    Alcohol use: Yes    Drug use: No    Sexual activity: Not on file   Other Topics Concern    Not on file   Social History Narrative    Not on file     Social Determinants of Health     Financial Resource Strain: Low Risk  (10/20/2024)    Financial Resource Strain     Within the past 12 months, have you or your family members you live with been unable to get utilities (heat, electricity) when it was really needed?: No   Food Insecurity: Low Risk  (10/20/2024)    Food Insecurity     Within the past 12 months, did you worry that your food would run out before you got money to buy more?: No     Within the past 12 months, did the food you bought just not last and you didn t have money to get more?: No   Transportation Needs: Low Risk  (10/20/2024)    Transportation Needs     Within the past 12 months, has lack of transportation kept you from medical appointments, getting your medicines, non-medical meetings or appointments, work, or from getting things that you need?: No   Physical Activity:  "Unknown (10/20/2024)    Exercise Vital Sign     Days of Exercise per Week: 2 days     Minutes of Exercise per Session: Patient declined   Stress: Stress Concern Present (10/20/2024)    [employer] - Occupational Stress Questionnaire     Feeling of Stress : To some extent   Social Connections: Unknown (10/20/2024)    Social Connection and Isolation Panel [NHANES]     Frequency of Communication with Friends and Family: Not on file     Frequency of Social Gatherings with Friends and Family: Three times a week     Attends Jainism Services: Not on file     Active Member of Clubs or Organizations: Not on file     Attends Club or Organization Meetings: Not on file     Marital Status: Not on file   Interpersonal Safety: Low Risk  (10/22/2024)    Interpersonal Safety     Do you feel physically and emotionally safe where you currently live?: Yes     Within the past 12 months, have you been hit, slapped, kicked or otherwise physically hurt by someone?: No     Within the past 12 months, have you been humiliated or emotionally abused in other ways by your partner or ex-partner?: No   Housing Stability: Low Risk  (10/20/2024)    Housing Stability     Do you have housing? : Yes     Are you worried about losing your housing?: No       Review of Systems  Comprehensive ROS: as above, otherwise all negative.           Objective:     /82   Pulse 82   Temp 98  F (36.7  C) (Temporal)   Resp 14   Ht 1.802 m (5' 10.95\")   Wt 103.8 kg (228 lb 12.8 oz)   SpO2 95%   BMI 31.96 kg/m    Body mass index is 31.96 kg/m .    Physical    General Appearance:    Alert, cooperative, no distress, appears stated age.  BMI 31.96.   Head:    Normocephalic, without obvious abnormality, atraumatic   Eyes:    PERRL, conjunctiva/corneas clear, EOM's intact, fundi     benign, both eyes        Ears:    Normal TM's and external ear canals, both ears   Nose:   Nares normal, septum midline, mucosa normal, no drainage    or " sinus tenderness   Throat:   Lips, mucosa, and tongue normal; teeth and gums normal   Neck:   Supple, symmetrical, trachea midline, no adenopathy;        thyroid:  No enlargement/tenderness/nodules; no carotid    bruit or JVD   Back:     Symmetric, no curvature, ROM normal, no CVA tenderness   Lungs:     Clear to auscultation bilaterally, respirations unlabored   Chest wall:    No tenderness or deformity   Heart:    Regular rate and rhythm, S1 and S2 normal, no murmur, rub   or gallop   Abdomen:     Soft, non-tender, bowel sounds active all four quadrants,     no masses, no organomegaly.     Genitalia:    Normal male without lesion, discharge or tenderness.  No inguinal hernia noted.     Rectal:    Normal tone.  Prostate normal/symmetric, no masses or tenderness.   Extremities:   Extremities normal, atraumatic, no cyanosis or edema   Pulses:   2+ and symmetric all extremities   Skin:   Skin color, texture, turgor normal, no rashes or lesions   Lymph nodes:   Cervical, supraclavicular, and axillary nodes normal   Neurologic:   CNII-XII intact. Normal strength, sensation and reflexes       throughout                This note has been dictated using voice recognition software and as a result may contain minor grammatical errors and unintended word substitutions.

## 2025-04-03 DIAGNOSIS — E11.9 TYPE 2 DIABETES MELLITUS WITHOUT COMPLICATION, WITHOUT LONG-TERM CURRENT USE OF INSULIN (H): ICD-10-CM

## 2025-04-03 RX ORDER — SEMAGLUTIDE 2.68 MG/ML
INJECTION, SOLUTION SUBCUTANEOUS
Qty: 9 ML | Refills: 3 | Status: SHIPPED | OUTPATIENT
Start: 2025-04-03

## 2025-04-21 DIAGNOSIS — E66.09 CLASS 1 OBESITY DUE TO EXCESS CALORIES WITH SERIOUS COMORBIDITY AND BODY MASS INDEX (BMI) OF 31.0 TO 31.9 IN ADULT: ICD-10-CM

## 2025-04-21 DIAGNOSIS — E66.811 CLASS 1 OBESITY DUE TO EXCESS CALORIES WITH SERIOUS COMORBIDITY AND BODY MASS INDEX (BMI) OF 31.0 TO 31.9 IN ADULT: ICD-10-CM

## 2025-04-21 DIAGNOSIS — E11.9 TYPE 2 DIABETES MELLITUS WITHOUT COMPLICATION, WITHOUT LONG-TERM CURRENT USE OF INSULIN (H): Primary | ICD-10-CM

## 2025-06-24 ENCOUNTER — RESULTS FOLLOW-UP (OUTPATIENT)
Dept: FAMILY MEDICINE | Facility: CLINIC | Age: 60
End: 2025-06-24

## 2025-06-24 ENCOUNTER — OFFICE VISIT (OUTPATIENT)
Dept: FAMILY MEDICINE | Facility: CLINIC | Age: 60
End: 2025-06-24
Payer: COMMERCIAL

## 2025-06-24 VITALS
RESPIRATION RATE: 14 BRPM | SYSTOLIC BLOOD PRESSURE: 122 MMHG | BODY MASS INDEX: 31.15 KG/M2 | HEIGHT: 71 IN | WEIGHT: 222.5 LBS | HEART RATE: 90 BPM | DIASTOLIC BLOOD PRESSURE: 80 MMHG | TEMPERATURE: 97.8 F | OXYGEN SATURATION: 94 %

## 2025-06-24 DIAGNOSIS — I10 ESSENTIAL HYPERTENSION: ICD-10-CM

## 2025-06-24 DIAGNOSIS — E66.09 CLASS 1 OBESITY DUE TO EXCESS CALORIES WITH SERIOUS COMORBIDITY AND BODY MASS INDEX (BMI) OF 31.0 TO 31.9 IN ADULT: ICD-10-CM

## 2025-06-24 DIAGNOSIS — E11.9 TYPE 2 DIABETES MELLITUS WITHOUT COMPLICATION, WITHOUT LONG-TERM CURRENT USE OF INSULIN (H): Primary | ICD-10-CM

## 2025-06-24 DIAGNOSIS — E78.5 HYPERLIPIDEMIA, UNSPECIFIED HYPERLIPIDEMIA TYPE: ICD-10-CM

## 2025-06-24 DIAGNOSIS — E66.811 CLASS 1 OBESITY DUE TO EXCESS CALORIES WITH SERIOUS COMORBIDITY AND BODY MASS INDEX (BMI) OF 31.0 TO 31.9 IN ADULT: ICD-10-CM

## 2025-06-24 LAB
ANION GAP SERPL CALCULATED.3IONS-SCNC: 10 MMOL/L (ref 7–15)
BUN SERPL-MCNC: 12.9 MG/DL (ref 8–23)
CALCIUM SERPL-MCNC: 10 MG/DL (ref 8.8–10.4)
CHLORIDE SERPL-SCNC: 101 MMOL/L (ref 98–107)
CREAT SERPL-MCNC: 0.95 MG/DL (ref 0.67–1.17)
EGFRCR SERPLBLD CKD-EPI 2021: >90 ML/MIN/1.73M2
EST. AVERAGE GLUCOSE BLD GHB EST-MCNC: 203 MG/DL
GLUCOSE SERPL-MCNC: 164 MG/DL (ref 70–99)
HBA1C MFR BLD: 8.7 % (ref 0–5.6)
HCO3 SERPL-SCNC: 25 MMOL/L (ref 22–29)
HOLD SPECIMEN: NORMAL
POTASSIUM SERPL-SCNC: 4.6 MMOL/L (ref 3.4–5.3)
SODIUM SERPL-SCNC: 136 MMOL/L (ref 135–145)

## 2025-06-24 PROCEDURE — 36415 COLL VENOUS BLD VENIPUNCTURE: CPT | Performed by: FAMILY MEDICINE

## 2025-06-24 PROCEDURE — 80048 BASIC METABOLIC PNL TOTAL CA: CPT | Performed by: FAMILY MEDICINE

## 2025-06-24 PROCEDURE — 3079F DIAST BP 80-89 MM HG: CPT | Performed by: FAMILY MEDICINE

## 2025-06-24 PROCEDURE — G2211 COMPLEX E/M VISIT ADD ON: HCPCS | Performed by: FAMILY MEDICINE

## 2025-06-24 PROCEDURE — 3074F SYST BP LT 130 MM HG: CPT | Performed by: FAMILY MEDICINE

## 2025-06-24 PROCEDURE — 99214 OFFICE O/P EST MOD 30 MIN: CPT | Performed by: FAMILY MEDICINE

## 2025-06-24 PROCEDURE — 1126F AMNT PAIN NOTED NONE PRSNT: CPT | Performed by: FAMILY MEDICINE

## 2025-06-24 PROCEDURE — 83036 HEMOGLOBIN GLYCOSYLATED A1C: CPT | Performed by: FAMILY MEDICINE

## 2025-06-24 PROCEDURE — 3052F HG A1C>EQUAL 8.0%<EQUAL 9.0%: CPT | Performed by: FAMILY MEDICINE

## 2025-06-24 ASSESSMENT — PAIN SCALES - GENERAL: PAINLEVEL_OUTOF10: NO PAIN (0)

## 2025-06-24 NOTE — PROGRESS NOTES
Assessment/Plan:    Type 2 diabetes mellitus without complication, without long-term current use of insulin (H)  Type 2 diabetes suboptimal control.  Improvement from 9.3% to 8.7% while on Ozempic 2 mg weekly and glipizide metformin 5/500 using 2 tablets twice daily.  Will switch from Ozempic and initiate Mounjaro 5 mg weekly and titrate to highest tolerated dose as directed.  Reassess at follow-up physical in 4 months.  Notify if concerns for hypoglycemia.  Prior microalbumin screen normal October 22, 2024 and will repeat at follow-up.  Diabetic eye exams continue annually.  No history of neuropathy noted.  - Hemoglobin A1c  - tirzepatide (MOUNJARO) 5 MG/0.5ML SOAJ auto-injector pen  Dispense: 2 mL; Refill: 0  - Basic metabolic panel    Essential hypertension  Hypertension stable with losartan 100 mg daily.  - Basic metabolic panel    Hyperlipidemia, unspecified hyperlipidemia type  Atorvastatin 20 mg daily for lipid management.    Class 1 obesity due to excess calories with serious comorbidity and body mass index (BMI) of 31.0 to 31.9 in adult  Dietary and exercise modification for weight goal less than 210 pounds initially, less than 200 pounds ideally.  1-1/2 pound weight loss since February 28, 2025.  Switch from Ozempic to Mounjaro with anticipated additional weight loss benefits noted.    The longitudinal plan of care for the diagnosis(es)/condition(s) as documented were addressed during this visit. Due to the added complexity in care, I will continue to support Gene in the subsequent management and with ongoing continuity of care.            Subjective:    Tyrese Pineda is seen today for follow-up assessment.  Type 2 diabetes.  Ozempic 2 mg weekly.  Glipizide metformin 5/500 using 2 tablets twice daily.  Tolerating well.  No hypoglycemic episodes.  Low-dose aspirin continuing.  Microalbumin screen normal October 22, 2024.  Losartan 100 mg daily for hypertension.  Atorvastatin 20 mg daily for lipid  "management.  Trying to stay active.  Denies recent illness.  Comprehensive review of systems as above otherwise all negative.  Prior physical October 22, 2024.       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 lupus   Sister and nephew with Celiac disease   No smoke   EtOH: [employer] \"1-2/night\" occ 4 on weekend if party, etc.   Surgeries: 3/00 - bilateral Lasik eye surgery; bilateral inguinal hernia repair 5/2014 (Dr. Edward Merino); bilateral knee arthroscopy and meniscectomy described   12/9/05 - [facility] (pneumonia)   Pneumovax 12/05   Tdap 6/25/08   [employer]         Past Surgical History:   Procedure Laterality Date    HERNIA REPAIR Bilateral May, 2014    Dr. Edward ASCENCIO Bilateral         Family History   Problem Relation Age of Onset    Celiac Disease Mother     Heart Disease Father         Past Medical History:   Diagnosis Date    Diabetes mellitus (H)     Hypertension         Social History     Tobacco Use    Smoking status: Former     Types: Cigarettes    Smokeless tobacco: Never   Vaping Use    Vaping status: Never Used   Substance Use Topics    Alcohol use: Yes    Drug use: No        Current Outpatient Medications   Medication Sig Dispense Refill    aspirin 81 MG EC tablet [ASPIRIN 81 MG EC TABLET] Take 81 mg by mouth daily.      atorvastatin (LIPITOR) 20 MG tablet TAKE ONE TABLET BY MOUTH EVERY DAY 90 tablet 3    diazepam (VALIUM) 10 MG tablet Take 0.5-1 tablets (5-10 mg) by mouth every 8 hours as needed for muscle spasms. 60 tablet 0    glipiZIDE-metFORMIN (METAGLIP) 5-500 MG tablet TAKE 2 TABLETS BY MOUTH TWICE  DAILY BEFORE MEALS 360 tablet 3    losartan (COZAAR) 100 MG tablet TAKE ONE TABLET BY MOUTH EVERY DAY 90 tablet 3    omeprazole (PRILOSEC) 20 MG capsule [OMEPRAZOLE (PRILOSEC) 20 MG CAPSULE] Take 20 mg by mouth daily.      tirzepatide (MOUNJARO) 5 MG/0.5ML SOAJ " "auto-injector pen Inject 0.5 mLs (5 mg) subcutaneously every 7 days. 2 mL 0          Objective:    Vitals:    06/24/25 0858   BP: 122/80   Pulse: 90   Resp: 14   Temp: 97.8  F (36.6  C)   TempSrc: Temporal   SpO2: 94%   Weight: 100.9 kg (222 lb 8 oz)   Height: 1.803 m (5' 11\")      Body mass index is 31.03 kg/m .    Alert.  No apparent distress.  Chest clear.  Cardiac exam regular.  Extremities warm and dry.      This note has been dictated using voice recognition software and as a result may contain minor grammatical errors and unintended word substitutions.   Answers submitted by the patient for this visit:  Diabetes Visit (Submitted on 6/23/2025)  Chief Complaint: Chronic problems general questions HPI Form  Frequency of checking blood sugars:: not at all  Diabetic concerns:: other  Paraesthesia present:: none of these symptoms  General Questionnaire (Submitted on 6/23/2025)  Chief Complaint: Chronic problems general questions HPI Form  How many servings of fruits and vegetables do you eat daily?: 0-1  On average, how many sweetened beverages do you drink each day (Examples: soda, juice, sweet tea, etc.  Do NOT count diet or artificially sweetened beverages)?: 1  How many minutes a day do you exercise enough to make your heart beat faster?: 9 or less  How many days a week do you exercise enough to make your heart beat faster?: 3 or less  How many days per week do you miss taking your medication?: 0  Questionnaire about: Chronic problems general questions HPI Form (Submitted on 6/23/2025)  Chief Complaint: Chronic problems general questions HPI Form    "

## 2025-06-25 DIAGNOSIS — E11.9 TYPE 2 DIABETES MELLITUS WITHOUT COMPLICATION, WITHOUT LONG-TERM CURRENT USE OF INSULIN (H): ICD-10-CM

## 2025-06-26 RX ORDER — GLIPIZIDE AND METFORMIN HCL 5; 500 MG/1; MG/1
TABLET, FILM COATED ORAL
Qty: 360 TABLET | Refills: 1 | Status: SHIPPED | OUTPATIENT
Start: 2025-06-26

## 2025-07-10 ENCOUNTER — TELEPHONE (OUTPATIENT)
Dept: FAMILY MEDICINE | Facility: CLINIC | Age: 60
End: 2025-07-10
Payer: COMMERCIAL

## 2025-07-10 DIAGNOSIS — E66.811 CLASS 1 OBESITY DUE TO EXCESS CALORIES WITH SERIOUS COMORBIDITY AND BODY MASS INDEX (BMI) OF 31.0 TO 31.9 IN ADULT: ICD-10-CM

## 2025-07-10 DIAGNOSIS — E66.09 CLASS 1 OBESITY DUE TO EXCESS CALORIES WITH SERIOUS COMORBIDITY AND BODY MASS INDEX (BMI) OF 31.0 TO 31.9 IN ADULT: ICD-10-CM

## 2025-07-10 DIAGNOSIS — E11.9 TYPE 2 DIABETES MELLITUS WITHOUT COMPLICATION, WITHOUT LONG-TERM CURRENT USE OF INSULIN (H): Primary | ICD-10-CM

## 2025-07-10 NOTE — TELEPHONE ENCOUNTER
"  General Call    Contacts       Contact Date/Time Type Contact Phone/Fax    07/10/2025 11:48 AM CDT Phone (Incoming) Jesse Pineda (Self) [phone] (M)          Reason for Call:  Patient said I am on Mounjaro  Patient relaying the following communication to Dr. Madrid.    \"I am doing fine on the current dose and it is fine to up the prescription to next dose.\"    Patient has 2 weeks left on current dose as of 7/10/25      What are your questions or concerns:  N/A    Date of last appointment with provider: 6/24/25    [facility] or would you prefer to receive a phone call?:   Patient would prefer a phone call   Okay to leave a detailed message?: Yes at Cell number on file:    Telephone Information:   Mobile [phone]     "

## 2025-07-10 NOTE — TELEPHONE ENCOUNTER
LOV 6/24/25:   Type 2 diabetes mellitus without complication, without long-term current use of insulin (H)  Type 2 diabetes suboptimal control.  Improvement from 9.3% to 8.7% while on Ozempic 2 mg weekly and glipizide metformin 5/500 using 2 tablets twice daily.  Will switch from Ozempic and initiate Mounjaro 5 mg weekly and titrate to highest tolerated dose as directed.  Reassess at follow-up physical in 4 months.  Notify if concerns for hypoglycemia.  Prior microalbumin screen normal October 22, 2024 and will repeat at follow-up.  Diabetic eye exams continue annually.  No history of neuropathy noted.  - Hemoglobin A1c  - tirzepatide (MOUNJARO) 5 MG/0.5ML SOAJ auto-injector pen  Dispense: 2 mL; Refill: 0  - Basic metabolic panel

## 2025-08-18 ENCOUNTER — TELEPHONE (OUTPATIENT)
Dept: FAMILY MEDICINE | Facility: CLINIC | Age: 60
End: 2025-08-18
Payer: COMMERCIAL

## 2025-08-18 DIAGNOSIS — E11.9 TYPE 2 DIABETES MELLITUS WITHOUT COMPLICATION, WITHOUT LONG-TERM CURRENT USE OF INSULIN (H): ICD-10-CM

## 2025-08-18 DIAGNOSIS — E66.09 CLASS 1 OBESITY DUE TO EXCESS CALORIES WITH SERIOUS COMORBIDITY AND BODY MASS INDEX (BMI) OF 31.0 TO 31.9 IN ADULT: ICD-10-CM

## 2025-08-18 DIAGNOSIS — E66.811 CLASS 1 OBESITY DUE TO EXCESS CALORIES WITH SERIOUS COMORBIDITY AND BODY MASS INDEX (BMI) OF 31.0 TO 31.9 IN ADULT: ICD-10-CM
